# Patient Record
Sex: FEMALE | Race: WHITE | NOT HISPANIC OR LATINO | Employment: OTHER | URBAN - NONMETROPOLITAN AREA
[De-identification: names, ages, dates, MRNs, and addresses within clinical notes are randomized per-mention and may not be internally consistent; named-entity substitution may affect disease eponyms.]

---

## 2017-06-05 ENCOUNTER — APPOINTMENT (OUTPATIENT)
Dept: CT IMAGING | Facility: HOSPITAL | Age: 82
End: 2017-06-05

## 2017-06-05 ENCOUNTER — HOSPITAL ENCOUNTER (EMERGENCY)
Facility: HOSPITAL | Age: 82
Discharge: HOME | End: 2017-06-05
Attending: EMERGENCY MEDICINE | Admitting: EMERGENCY MEDICINE

## 2017-06-05 ENCOUNTER — APPOINTMENT (OUTPATIENT)
Dept: GENERAL RADIOLOGY | Facility: HOSPITAL | Age: 82
End: 2017-06-05

## 2017-06-05 VITALS
HEART RATE: 81 BPM | HEIGHT: 63 IN | DIASTOLIC BLOOD PRESSURE: 91 MMHG | SYSTOLIC BLOOD PRESSURE: 182 MMHG | RESPIRATION RATE: 18 BRPM | BODY MASS INDEX: 20.55 KG/M2 | TEMPERATURE: 97.6 F | WEIGHT: 116 LBS | OXYGEN SATURATION: 98 %

## 2017-06-05 DIAGNOSIS — K59.00 CONSTIPATION, UNSPECIFIED CONSTIPATION TYPE: ICD-10-CM
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
## 2017-06-05 DIAGNOSIS — S22.000A COMPRESSION FX, THORACIC SPINE, CLOSED, INITIAL ENCOUNTER (HCC): ICD-10-CM

## 2017-06-05 PROCEDURE — 99284 EMERGENCY DEPT VISIT MOD MDM: CPT

## 2017-06-05 PROCEDURE — 70450 CT HEAD/BRAIN W/O DYE: CPT | Performed by: RADIOLOGY

## 2017-06-05 PROCEDURE — 71101 X-RAY EXAM UNILAT RIBS/CHEST: CPT | Performed by: RADIOLOGY

## 2017-06-05 PROCEDURE — 72131 CT LUMBAR SPINE W/O DYE: CPT

## 2017-06-05 PROCEDURE — 72128 CT CHEST SPINE W/O DYE: CPT

## 2017-06-05 PROCEDURE — 72131 CT LUMBAR SPINE W/O DYE: CPT | Performed by: RADIOLOGY

## 2017-06-05 PROCEDURE — 70450 CT HEAD/BRAIN W/O DYE: CPT

## 2017-06-05 PROCEDURE — 93005 ELECTROCARDIOGRAM TRACING: CPT | Performed by: EMERGENCY MEDICINE

## 2017-06-05 PROCEDURE — 93010 ELECTROCARDIOGRAM REPORT: CPT | Performed by: INTERNAL MEDICINE

## 2017-06-05 PROCEDURE — 72128 CT CHEST SPINE W/O DYE: CPT | Performed by: RADIOLOGY

## 2017-06-05 PROCEDURE — 71101 X-RAY EXAM UNILAT RIBS/CHEST: CPT

## 2017-06-05 RX ORDER — CLONIDINE HYDROCHLORIDE 0.2 MG/1
0.2 TABLET ORAL 3 TIMES DAILY
COMMUNITY

## 2017-06-05 RX ORDER — FOSINOPRIL SODIUM 40 MG/1
40 TABLET ORAL DAILY
COMMUNITY

## 2017-06-05 RX ORDER — OMEPRAZOLE 20 MG/1
20 CAPSULE, DELAYED RELEASE ORAL DAILY
COMMUNITY

## 2017-06-05 RX ORDER — RANITIDINE 150 MG/1
150 TABLET ORAL DAILY
COMMUNITY
End: 2020-07-15

## 2017-06-05 RX ORDER — ASPIRIN 81 MG/1
81 TABLET, CHEWABLE ORAL DAILY
Status: ON HOLD | COMMUNITY
End: 2017-07-24 | Stop reason: DRUGHIGH

## 2017-06-05 RX ORDER — SIMVASTATIN 40 MG
40 TABLET ORAL NIGHTLY
COMMUNITY
End: 2017-07-27 | Stop reason: HOSPADM

## 2017-06-05 RX ORDER — HYDROCODONE BITARTRATE AND ACETAMINOPHEN 5; 325 MG/1; MG/1
1 TABLET ORAL ONCE
Status: COMPLETED | OUTPATIENT
Start: 2017-06-05 | End: 2017-06-05

## 2017-06-05 RX ORDER — AMLODIPINE BESYLATE 5 MG/1
5 TABLET ORAL DAILY
COMMUNITY

## 2017-06-05 RX ORDER — LEVOTHYROXINE SODIUM 0.1 MG/1
100 TABLET ORAL DAILY
COMMUNITY

## 2017-06-05 RX ORDER — ASCORBIC ACID 500 MG
500 TABLET ORAL DAILY
COMMUNITY

## 2017-06-05 RX ORDER — POLYETHYLENE GLYCOL 3350 17 G/17G
17 POWDER, FOR SOLUTION ORAL DAILY
Qty: 250 G | Refills: 0 | Status: SHIPPED | OUTPATIENT
Start: 2017-06-05

## 2017-06-05 RX ORDER — HYDRALAZINE HYDROCHLORIDE 25 MG/1
25 TABLET, FILM COATED ORAL 3 TIMES DAILY
COMMUNITY
End: 2017-07-27 | Stop reason: HOSPADM

## 2017-06-05 RX ORDER — CLOPIDOGREL BISULFATE 75 MG/1
75 TABLET ORAL NIGHTLY
COMMUNITY

## 2017-06-05 RX ORDER — ACETAMINOPHEN AND CODEINE PHOSPHATE 300; 30 MG/1; MG/1
1 TABLET ORAL EVERY 6 HOURS PRN
Qty: 12 TABLET | Refills: 0 | Status: ON HOLD | OUTPATIENT
Start: 2017-06-05 | End: 2017-07-24

## 2017-06-05 RX ORDER — LORAZEPAM 2 MG/ML
INJECTION INTRAMUSCULAR
Status: DISCONTINUED
Start: 2017-06-05 | End: 2017-06-05 | Stop reason: WASHOUT

## 2017-06-05 RX ADMIN — HYDROCODONE BITARTRATE AND ACETAMINOPHEN 1 TABLET: 5; 325 TABLET ORAL at 17:11

## 2017-06-05 NOTE — ED PROVIDER NOTES
Subjective   HPI Comments: Explained to family to follow up with dr paget number was given     Patient is a 88 y.o. female presenting with fall.   History provided by:  Patient   used: No    Fall   Injury location:  Head/neck  Time since incident:  18 hours  Arrived directly from scene: no    Prior to arrival data:     Bystander interventions:  None    Patient ambulatory at scene: yes      Blood loss:  None    Orientation at scene:  Person, place, situation and time  Associated symptoms: back pain    Associated symptoms: no chest pain, no headaches, no nausea and no vomiting        Review of Systems   Constitutional: Negative for chills and fever.   HENT: Negative for congestion, ear pain and sore throat.    Respiratory: Negative for cough, shortness of breath and wheezing.    Cardiovascular: Negative for chest pain.   Gastrointestinal: Negative for diarrhea, nausea and vomiting.   Genitourinary: Negative for dysuria and flank pain.   Musculoskeletal: Positive for arthralgias and back pain.   Skin: Negative for rash.   Neurological: Negative for headaches.   Psychiatric/Behavioral: Negative for suicidal ideas. The patient is not nervous/anxious.    All other systems reviewed and are negative.      Past Medical History:   Diagnosis Date   • Hypertension    • Thyroid disease        No Known Allergies    History reviewed. No pertinent surgical history.    History reviewed. No pertinent family history.    Social History     Social History   • Marital status:      Spouse name: N/A   • Number of children: N/A   • Years of education: N/A     Social History Main Topics   • Smoking status: Never Smoker   • Smokeless tobacco: None   • Alcohol use No   • Drug use: No   • Sexual activity: Not Asked     Other Topics Concern   • None     Social History Narrative   • None           Objective   Physical Exam   Constitutional: She is oriented to person, place, and time. She appears well-developed and  well-nourished.   HENT:   Head: Normocephalic.   Mouth/Throat: Oropharynx is clear and moist.   Neck: Neck supple.   Cardiovascular: Normal rate and regular rhythm.    Pulmonary/Chest: Effort normal and breath sounds normal.   Abdominal: Soft. Bowel sounds are normal. There is no tenderness.   Musculoskeletal: Normal range of motion.   Neurological: She is alert and oriented to person, place, and time.   Skin: Skin is warm and dry.   Psychiatric: She has a normal mood and affect. Her behavior is normal. Judgment and thought content normal.   Nursing note and vitals reviewed.      Procedures         ED Course  ED Course   Comment By Time   84 NORMAL SINUS RHYTHM PER EZIO Ireland 06/05 1956                  [facility]    Final diagnoses:   Fall, initial encounter   Constipation, unspecified constipation type   Compression fx, thoracic spine, closed, initial encounter            EZIO Perry  06/08/17 1010       EZIO Perry  06/08/17 1013

## 2017-06-05 NOTE — ED NOTES
Patient noted to be lying in bed with HOB elevated. Family noted to be at bedside. Updated that patient has an order for a CT scan of the head and Chest X-ray PA. Patient complaints of soreness to lower back. No complaints of nausea or vomiting. Denies any needs at this time. NADN. Resps even and unlabored. Safety precautions in place, call light within reach.      Lory Toledo RN  06/05/17 1942

## 2017-07-23 ENCOUNTER — HOSPITAL ENCOUNTER (INPATIENT)
Facility: HOSPITAL | Age: 82
LOS: 2 days | Discharge: HOME | End: 2017-07-27
Attending: EMERGENCY MEDICINE | Admitting: INTERNAL MEDICINE

## 2017-07-23 ENCOUNTER — APPOINTMENT (OUTPATIENT)
Dept: CT IMAGING | Facility: HOSPITAL | Age: 82
End: 2017-07-23

## 2017-07-23 ENCOUNTER — APPOINTMENT (OUTPATIENT)
Dept: GENERAL RADIOLOGY | Facility: HOSPITAL | Age: 82
End: 2017-07-23

## 2017-07-23 DIAGNOSIS — K80.20 MULTIPLE GALLSTONES: ICD-10-CM

## 2017-07-23 DIAGNOSIS — S22.000G CLOSED COMPRESSION FRACTURE OF THORACIC VERTEBRA, WITH DELAYED HEALING, SUBSEQUENT ENCOUNTER: ICD-10-CM

## 2017-07-23 DIAGNOSIS — R77.8 ELEVATED TROPONIN: Primary | ICD-10-CM

## 2017-07-23 LAB
6-ACETYL MORPHINE: NEGATIVE
A-A DO2: 29.4 MMHG (ref 0–300)
ALBUMIN SERPL-MCNC: 4.5 G/DL (ref 3.4–4.8)
ALBUMIN/GLOB SERPL: 1.2 G/DL (ref 1.5–2.5)
ALP SERPL-CCNC: 90 U/L (ref 35–104)
ALT SERPL W P-5'-P-CCNC: 8 U/L (ref 10–36)
AMPHET+METHAMPHET UR QL: NEGATIVE
ANION GAP SERPL CALCULATED.3IONS-SCNC: 4.1 MMOL/L (ref 3.6–11.2)
ARTERIAL PATENCY WRIST A: ABNORMAL
AST SERPL-CCNC: 16 U/L (ref 10–30)
ATMOSPHERIC PRESS: 725 MMHG
BACTERIA UR QL AUTO: ABNORMAL /HPF
BARBITURATES UR QL SCN: NEGATIVE
BASE EXCESS BLDA CALC-SCNC: 1.4 MMOL/L
BASOPHILS # BLD AUTO: 0.07 10*3/MM3 (ref 0–0.3)
BASOPHILS NFR BLD AUTO: 0.5 % (ref 0–2)
BDY SITE: ABNORMAL
BENZODIAZ UR QL SCN: NEGATIVE
BILIRUB SERPL-MCNC: 0.5 MG/DL (ref 0.2–1.8)
BILIRUB UR QL STRIP: NEGATIVE
BNP SERPL-MCNC: 173 PG/ML (ref 0–100)
BODY TEMPERATURE: 98.6 C
BUN BLD-MCNC: 20 MG/DL (ref 7–21)
BUN/CREAT SERPL: 17.5 (ref 7–25)
BUPRENORPHINE SERPL-MCNC: NEGATIVE NG/ML
CALCIUM SPEC-SCNC: 10 MG/DL (ref 7.7–10)
CANNABINOIDS SERPL QL: NEGATIVE
CHLORIDE SERPL-SCNC: 107 MMOL/L (ref 99–112)
CK MB SERPL-CCNC: 2.51 NG/ML (ref 0–5)
CK MB SERPL-RTO: 5.7 % (ref 0–3)
CK SERPL-CCNC: 44 U/L (ref 24–173)
CLARITY UR: CLEAR
CO2 SERPL-SCNC: 30.9 MMOL/L (ref 24.3–31.9)
COCAINE UR QL: NEGATIVE
COHGB MFR BLD: 2 % (ref 0–5)
COLOR UR: YELLOW
CREAT BLD-MCNC: 1.14 MG/DL (ref 0.43–1.29)
DEPRECATED RDW RBC AUTO: 52.2 FL (ref 37–54)
EOSINOPHIL # BLD AUTO: 0.09 10*3/MM3 (ref 0–0.7)
EOSINOPHIL NFR BLD AUTO: 0.6 % (ref 0–7)
ERYTHROCYTE [DISTWIDTH] IN BLOOD BY AUTOMATED COUNT: 16 % (ref 11.5–14.5)
GFR SERPL CREATININE-BSD FRML MDRD: 45 ML/MIN/1.73
GLOBULIN UR ELPH-MCNC: 3.7 GM/DL
GLUCOSE BLD-MCNC: 118 MG/DL (ref 70–110)
GLUCOSE UR STRIP-MCNC: NEGATIVE MG/DL
HCO3 BLDA-SCNC: 24.7 MMOL/L (ref 22–26)
HCT VFR BLD AUTO: 41.1 % (ref 37–47)
HCT VFR BLD CALC: 42 % (ref 37–47)
HGB BLD-MCNC: 13.2 G/DL (ref 12–16)
HGB BLDA-MCNC: 14.3 G/DL (ref 12–16)
HGB UR QL STRIP.AUTO: NEGATIVE
HOROWITZ INDEX BLD+IHG-RTO: 21 %
HYALINE CASTS UR QL AUTO: ABNORMAL /LPF
IMM GRANULOCYTES # BLD: 0.16 10*3/MM3 (ref 0–0.03)
IMM GRANULOCYTES NFR BLD: 1.1 % (ref 0–0.5)
KETONES UR QL STRIP: NEGATIVE
LEUKOCYTE ESTERASE UR QL STRIP.AUTO: ABNORMAL
LYMPHOCYTES # BLD AUTO: 1.85 10*3/MM3 (ref 1–3)
LYMPHOCYTES NFR BLD AUTO: 12.9 % (ref 16–46)
MCH RBC QN AUTO: 29.7 PG (ref 27–33)
MCHC RBC AUTO-ENTMCNC: 32.1 G/DL (ref 33–37)
MCV RBC AUTO: 92.4 FL (ref 80–94)
METHADONE UR QL SCN: NEGATIVE
METHGB BLD QL: 0 % (ref 0–3)
MODALITY: ABNORMAL
MONOCYTES # BLD AUTO: 2.12 10*3/MM3 (ref 0.1–0.9)
MONOCYTES NFR BLD AUTO: 14.8 % (ref 0–12)
MYOGLOBIN SERPL-MCNC: 53 NG/ML (ref 0–109)
NEUTROPHILS # BLD AUTO: 10.07 10*3/MM3 (ref 1.4–6.5)
NEUTROPHILS NFR BLD AUTO: 70.1 % (ref 40–75)
NITRITE UR QL STRIP: NEGATIVE
OPIATES UR QL: NEGATIVE
OSMOLALITY SERPL CALC.SUM OF ELEC: 286.8 MOSM/KG (ref 273–305)
OXYCODONE UR QL SCN: NEGATIVE
OXYHGB MFR BLDV: 93.2 % (ref 85–100)
PCO2 BLDA: 34.9 MM HG (ref 35–45)
PCP UR QL SCN: NEGATIVE
PH BLDA: 7.47 PH UNITS (ref 7.35–7.45)
PH UR STRIP.AUTO: 6.5 [PH] (ref 5–8)
PLATELET # BLD AUTO: 213 10*3/MM3 (ref 130–400)
PMV BLD AUTO: 12.4 FL (ref 6–10)
PO2 BLDA: 71.2 MM HG (ref 80–100)
POTASSIUM BLD-SCNC: 3.7 MMOL/L (ref 3.5–5.3)
PROT SERPL-MCNC: 8.2 G/DL (ref 6–8)
PROT UR QL STRIP: NEGATIVE
RBC # BLD AUTO: 4.45 10*6/MM3 (ref 4.2–5.4)
RBC # UR: ABNORMAL /HPF
REF LAB TEST METHOD: ABNORMAL
SAO2 % BLDCOA: 95.1 % (ref 90–100)
SODIUM BLD-SCNC: 142 MMOL/L (ref 135–153)
SP GR UR STRIP: 1.01 (ref 1–1.03)
SQUAMOUS #/AREA URNS HPF: ABNORMAL /HPF
TROPONIN I SERPL-MCNC: 0.05 NG/ML
UROBILINOGEN UR QL STRIP: ABNORMAL
WBC NRBC COR # BLD: 14.36 10*3/MM3 (ref 4.5–12.5)
WBC UR QL AUTO: ABNORMAL /HPF

## 2017-07-23 PROCEDURE — 83050 HGB METHEMOGLOBIN QUAN: CPT | Performed by: PHYSICIAN ASSISTANT

## 2017-07-23 PROCEDURE — 83874 ASSAY OF MYOGLOBIN: CPT | Performed by: PHYSICIAN ASSISTANT

## 2017-07-23 PROCEDURE — 71250 CT THORAX DX C-: CPT | Performed by: RADIOLOGY

## 2017-07-23 PROCEDURE — 80307 DRUG TEST PRSMV CHEM ANLYZR: CPT | Performed by: PHYSICIAN ASSISTANT

## 2017-07-23 PROCEDURE — 96365 THER/PROPH/DIAG IV INF INIT: CPT

## 2017-07-23 PROCEDURE — 70450 CT HEAD/BRAIN W/O DYE: CPT | Performed by: RADIOLOGY

## 2017-07-23 PROCEDURE — 71020 HC CHEST PA AND LATERAL: CPT

## 2017-07-23 PROCEDURE — 83880 ASSAY OF NATRIURETIC PEPTIDE: CPT | Performed by: PHYSICIAN ASSISTANT

## 2017-07-23 PROCEDURE — 25010000002 PIPERACILLIN-TAZOBACTAM: Performed by: FAMILY MEDICINE

## 2017-07-23 PROCEDURE — 82805 BLOOD GASES W/O2 SATURATION: CPT | Performed by: PHYSICIAN ASSISTANT

## 2017-07-23 PROCEDURE — 71020 XR CHEST 2 VW: CPT | Performed by: RADIOLOGY

## 2017-07-23 PROCEDURE — 80053 COMPREHEN METABOLIC PANEL: CPT | Performed by: PHYSICIAN ASSISTANT

## 2017-07-23 PROCEDURE — 82550 ASSAY OF CK (CPK): CPT | Performed by: PHYSICIAN ASSISTANT

## 2017-07-23 PROCEDURE — 25010000002 HYDRALAZINE PER 20 MG: Performed by: FAMILY MEDICINE

## 2017-07-23 PROCEDURE — 93005 ELECTROCARDIOGRAM TRACING: CPT | Performed by: EMERGENCY MEDICINE

## 2017-07-23 PROCEDURE — 81001 URINALYSIS AUTO W/SCOPE: CPT | Performed by: PHYSICIAN ASSISTANT

## 2017-07-23 PROCEDURE — G0378 HOSPITAL OBSERVATION PER HR: HCPCS

## 2017-07-23 PROCEDURE — 84484 ASSAY OF TROPONIN QUANT: CPT | Performed by: FAMILY MEDICINE

## 2017-07-23 PROCEDURE — 85025 COMPLETE CBC W/AUTO DIFF WBC: CPT | Performed by: PHYSICIAN ASSISTANT

## 2017-07-23 PROCEDURE — 84484 ASSAY OF TROPONIN QUANT: CPT | Performed by: PHYSICIAN ASSISTANT

## 2017-07-23 PROCEDURE — 96375 TX/PRO/DX INJ NEW DRUG ADDON: CPT

## 2017-07-23 PROCEDURE — 82553 CREATINE MB FRACTION: CPT | Performed by: PHYSICIAN ASSISTANT

## 2017-07-23 PROCEDURE — 82375 ASSAY CARBOXYHB QUANT: CPT | Performed by: PHYSICIAN ASSISTANT

## 2017-07-23 PROCEDURE — 99284 EMERGENCY DEPT VISIT MOD MDM: CPT

## 2017-07-23 PROCEDURE — 36415 COLL VENOUS BLD VENIPUNCTURE: CPT

## 2017-07-23 PROCEDURE — 71250 CT THORAX DX C-: CPT

## 2017-07-23 PROCEDURE — 36600 WITHDRAWAL OF ARTERIAL BLOOD: CPT | Performed by: PHYSICIAN ASSISTANT

## 2017-07-23 PROCEDURE — 70450 CT HEAD/BRAIN W/O DYE: CPT

## 2017-07-23 RX ORDER — CLOPIDOGREL BISULFATE 75 MG/1
75 TABLET ORAL ONCE
Status: COMPLETED | OUTPATIENT
Start: 2017-07-23 | End: 2017-07-23

## 2017-07-23 RX ORDER — ASPIRIN 81 MG/1
324 TABLET, CHEWABLE ORAL ONCE
Status: COMPLETED | OUTPATIENT
Start: 2017-07-23 | End: 2017-07-23

## 2017-07-23 RX ORDER — HYDRALAZINE HYDROCHLORIDE 20 MG/ML
10 INJECTION INTRAMUSCULAR; INTRAVENOUS ONCE
Status: COMPLETED | OUTPATIENT
Start: 2017-07-23 | End: 2017-07-23

## 2017-07-23 RX ORDER — SODIUM CHLORIDE 0.9 % (FLUSH) 0.9 %
10 SYRINGE (ML) INJECTION AS NEEDED
Status: DISCONTINUED | OUTPATIENT
Start: 2017-07-23 | End: 2017-07-27 | Stop reason: HOSPADM

## 2017-07-23 RX ORDER — HYDRALAZINE HYDROCHLORIDE 20 MG/ML
20 INJECTION INTRAMUSCULAR; INTRAVENOUS ONCE
Status: DISCONTINUED | OUTPATIENT
Start: 2017-07-23 | End: 2017-07-23

## 2017-07-23 RX ORDER — NITROGLYCERIN 0.4 MG/1
0.4 TABLET SUBLINGUAL
Status: DISCONTINUED | OUTPATIENT
Start: 2017-07-23 | End: 2017-07-27 | Stop reason: HOSPADM

## 2017-07-23 RX ADMIN — CLOPIDOGREL 75 MG: 75 TABLET, FILM COATED ORAL at 23:37

## 2017-07-23 RX ADMIN — TAZOBACTAM SODIUM AND PIPERACILLIN SODIUM 4.5 G: .5; 4 INJECTION, POWDER, LYOPHILIZED, FOR SOLUTION INTRAVENOUS at 22:58

## 2017-07-23 RX ADMIN — HYDRALAZINE HYDROCHLORIDE 10 MG: 20 INJECTION INTRAMUSCULAR; INTRAVENOUS at 21:53

## 2017-07-23 RX ADMIN — ASPIRIN 324 MG: 81 TABLET, CHEWABLE ORAL at 22:54

## 2017-07-24 ENCOUNTER — APPOINTMENT (OUTPATIENT)
Dept: CARDIOLOGY | Facility: HOSPITAL | Age: 82
End: 2017-07-24
Attending: INTERNAL MEDICINE

## 2017-07-24 LAB
BASOPHILS # BLD AUTO: 0.08 10*3/MM3 (ref 0–0.3)
BASOPHILS NFR BLD AUTO: 0.5 % (ref 0–2)
BH CV ECHO MEAS - ACS: 1.3 CM
BH CV ECHO MEAS - AO ROOT AREA (BSA CORRECTED): 1.8
BH CV ECHO MEAS - AO ROOT AREA: 5.3 CM^2
BH CV ECHO MEAS - AO ROOT DIAM: 2.6 CM
BH CV ECHO MEAS - BSA(HAYCOCK): 1.4 M^2
BH CV ECHO MEAS - BSA: 1.4 M^2
BH CV ECHO MEAS - BZI_BMI: 21.1 KILOGRAMS/M^2
BH CV ECHO MEAS - BZI_METRIC_HEIGHT: 152.4 CM
BH CV ECHO MEAS - BZI_METRIC_WEIGHT: 49 KG
BH CV ECHO MEAS - CONTRAST EF 4CH: 46 ML/M^2
BH CV ECHO MEAS - EDV(CUBED): 124 ML
BH CV ECHO MEAS - EDV(MOD-SP4): 50 ML
BH CV ECHO MEAS - EDV(TEICH): 117.5 ML
BH CV ECHO MEAS - EF(CUBED): 67 %
BH CV ECHO MEAS - EF(MOD-SP4): 46 %
BH CV ECHO MEAS - EF(TEICH): 58.3 %
BH CV ECHO MEAS - ESV(CUBED): 40.9 ML
BH CV ECHO MEAS - ESV(MOD-SP4): 27 ML
BH CV ECHO MEAS - ESV(TEICH): 49 ML
BH CV ECHO MEAS - FS: 30.9 %
BH CV ECHO MEAS - IVS/LVPW: 1.3
BH CV ECHO MEAS - IVSD: 1.1 CM
BH CV ECHO MEAS - LA DIMENSION: 3.8 CM
BH CV ECHO MEAS - LA/AO: 1.5
BH CV ECHO MEAS - LV DIASTOLIC VOL/BSA (35-75): 34.8 ML/M^2
BH CV ECHO MEAS - LV MASS(C)D: 183.2 GRAMS
BH CV ECHO MEAS - LV MASS(C)DI: 127.5 GRAMS/M^2
BH CV ECHO MEAS - LV SYSTOLIC VOL/BSA (12-30): 18.8 ML/M^2
BH CV ECHO MEAS - LVIDD: 5 CM
BH CV ECHO MEAS - LVIDS: 3.4 CM
BH CV ECHO MEAS - LVLD AP4: 6.8 CM
BH CV ECHO MEAS - LVLS AP4: 6.5 CM
BH CV ECHO MEAS - LVOT AREA (M): 3.1 CM^2
BH CV ECHO MEAS - LVOT AREA: 3.1 CM^2
BH CV ECHO MEAS - LVOT DIAM: 2 CM
BH CV ECHO MEAS - LVPWD: 0.88 CM
BH CV ECHO MEAS - MV A MAX VEL: 124.1 CM/SEC
BH CV ECHO MEAS - MV E MAX VEL: 65.1 CM/SEC
BH CV ECHO MEAS - MV E/A: 0.52
BH CV ECHO MEAS - PA ACC SLOPE: 496 CM/SEC^2
BH CV ECHO MEAS - PA ACC TIME: 0.14 SEC
BH CV ECHO MEAS - PA PR(ACCEL): 17.2 MMHG
BH CV ECHO MEAS - SI(CUBED): 57.8 ML/M^2
BH CV ECHO MEAS - SI(MOD-SP4): 16 ML/M^2
BH CV ECHO MEAS - SI(TEICH): 47.7 ML/M^2
BH CV ECHO MEAS - SV(CUBED): 83 ML
BH CV ECHO MEAS - SV(MOD-SP4): 23 ML
BH CV ECHO MEAS - SV(TEICH): 68.5 ML
CK MB SERPL-CCNC: 1.42 NG/ML (ref 0–5)
CK MB SERPL-CCNC: 2.39 NG/ML (ref 0–5)
CK MB SERPL-CCNC: 2.41 NG/ML (ref 0–5)
CK MB SERPL-RTO: 2.8 % (ref 0–3)
CK MB SERPL-RTO: 3.9 % (ref 0–3)
CK MB SERPL-RTO: 4.2 % (ref 0–3)
CK SERPL-CCNC: 50 U/L (ref 24–173)
CK SERPL-CCNC: 58 U/L (ref 24–173)
CK SERPL-CCNC: 61 U/L (ref 24–173)
DEPRECATED RDW RBC AUTO: 52.4 FL (ref 37–54)
EOSINOPHIL # BLD AUTO: 0.07 10*3/MM3 (ref 0–0.7)
EOSINOPHIL NFR BLD AUTO: 0.4 % (ref 0–7)
ERYTHROCYTE [DISTWIDTH] IN BLOOD BY AUTOMATED COUNT: 16.1 % (ref 11.5–14.5)
HCT VFR BLD AUTO: 38.7 % (ref 37–47)
HGB BLD-MCNC: 12.5 G/DL (ref 12–16)
IMM GRANULOCYTES # BLD: 0.31 10*3/MM3 (ref 0–0.03)
IMM GRANULOCYTES NFR BLD: 1.9 % (ref 0–0.5)
LV EF 2D ECHO EST: 65 %
LYMPHOCYTES # BLD AUTO: 1.9 10*3/MM3 (ref 1–3)
LYMPHOCYTES NFR BLD AUTO: 11.7 % (ref 16–46)
MCH RBC QN AUTO: 30.1 PG (ref 27–33)
MCHC RBC AUTO-ENTMCNC: 32.3 G/DL (ref 33–37)
MCV RBC AUTO: 93.3 FL (ref 80–94)
MONOCYTES # BLD AUTO: 2.09 10*3/MM3 (ref 0.1–0.9)
MONOCYTES NFR BLD AUTO: 12.9 % (ref 0–12)
MYOGLOBIN SERPL-MCNC: 115 NG/ML (ref 0–109)
MYOGLOBIN SERPL-MCNC: 74 NG/ML (ref 0–109)
MYOGLOBIN SERPL-MCNC: 84 NG/ML (ref 0–109)
NEUTROPHILS # BLD AUTO: 11.73 10*3/MM3 (ref 1.4–6.5)
NEUTROPHILS NFR BLD AUTO: 72.6 % (ref 40–75)
PLATELET # BLD AUTO: 204 10*3/MM3 (ref 130–400)
PMV BLD AUTO: 12.4 FL (ref 6–10)
RBC # BLD AUTO: 4.15 10*6/MM3 (ref 4.2–5.4)
TROPONIN I SERPL-MCNC: 0.04 NG/ML
TROPONIN I SERPL-MCNC: 0.04 NG/ML
TROPONIN I SERPL-MCNC: 0.05 NG/ML
WBC NRBC COR # BLD: 16.18 10*3/MM3 (ref 4.5–12.5)

## 2017-07-24 PROCEDURE — 93010 ELECTROCARDIOGRAM REPORT: CPT | Performed by: INTERNAL MEDICINE

## 2017-07-24 PROCEDURE — 84484 ASSAY OF TROPONIN QUANT: CPT | Performed by: FAMILY MEDICINE

## 2017-07-24 PROCEDURE — 99222 1ST HOSP IP/OBS MODERATE 55: CPT | Performed by: INTERNAL MEDICINE

## 2017-07-24 PROCEDURE — G0378 HOSPITAL OBSERVATION PER HR: HCPCS

## 2017-07-24 PROCEDURE — 82553 CREATINE MB FRACTION: CPT | Performed by: FAMILY MEDICINE

## 2017-07-24 PROCEDURE — 83874 ASSAY OF MYOGLOBIN: CPT | Performed by: FAMILY MEDICINE

## 2017-07-24 PROCEDURE — 93005 ELECTROCARDIOGRAM TRACING: CPT | Performed by: FAMILY MEDICINE

## 2017-07-24 PROCEDURE — 94799 UNLISTED PULMONARY SVC/PX: CPT

## 2017-07-24 PROCEDURE — 85025 COMPLETE CBC W/AUTO DIFF WBC: CPT | Performed by: FAMILY MEDICINE

## 2017-07-24 PROCEDURE — 93306 TTE W/DOPPLER COMPLETE: CPT

## 2017-07-24 PROCEDURE — 82550 ASSAY OF CK (CPK): CPT | Performed by: FAMILY MEDICINE

## 2017-07-24 PROCEDURE — 93306 TTE W/DOPPLER COMPLETE: CPT | Performed by: INTERNAL MEDICINE

## 2017-07-24 RX ORDER — SPIRONOLACTONE 25 MG/1
25 TABLET ORAL DAILY
Status: DISCONTINUED | OUTPATIENT
Start: 2017-07-24 | End: 2017-07-27 | Stop reason: HOSPADM

## 2017-07-24 RX ORDER — ASPIRIN 81 MG/1
81 TABLET ORAL NIGHTLY
Status: CANCELLED | OUTPATIENT
Start: 2017-07-24

## 2017-07-24 RX ORDER — CLOPIDOGREL BISULFATE 75 MG/1
75 TABLET ORAL NIGHTLY
Status: DISCONTINUED | OUTPATIENT
Start: 2017-07-24 | End: 2017-07-27 | Stop reason: HOSPADM

## 2017-07-24 RX ORDER — HYDRALAZINE HYDROCHLORIDE 25 MG/1
25 TABLET, FILM COATED ORAL EVERY 8 HOURS SCHEDULED
Status: DISCONTINUED | OUTPATIENT
Start: 2017-07-24 | End: 2017-07-24

## 2017-07-24 RX ORDER — CLONIDINE HYDROCHLORIDE 0.2 MG/1
0.2 TABLET ORAL 3 TIMES DAILY
Status: CANCELLED | OUTPATIENT
Start: 2017-07-24

## 2017-07-24 RX ORDER — LEVOTHYROXINE SODIUM 0.1 MG/1
100 TABLET ORAL
Status: DISCONTINUED | OUTPATIENT
Start: 2017-07-24 | End: 2017-07-27 | Stop reason: HOSPADM

## 2017-07-24 RX ORDER — ASCORBIC ACID 500 MG
500 TABLET ORAL DAILY
Status: CANCELLED | OUTPATIENT
Start: 2017-07-24

## 2017-07-24 RX ORDER — HYDRALAZINE HYDROCHLORIDE 25 MG/1
25 TABLET, FILM COATED ORAL 3 TIMES DAILY
Status: CANCELLED | OUTPATIENT
Start: 2017-07-24

## 2017-07-24 RX ORDER — PANTOPRAZOLE SODIUM 40 MG/1
40 TABLET, DELAYED RELEASE ORAL EVERY MORNING
Status: DISCONTINUED | OUTPATIENT
Start: 2017-07-24 | End: 2017-07-27 | Stop reason: HOSPADM

## 2017-07-24 RX ORDER — LEVOTHYROXINE SODIUM 0.1 MG/1
100 TABLET ORAL DAILY
Status: CANCELLED | OUTPATIENT
Start: 2017-07-24

## 2017-07-24 RX ORDER — ATORVASTATIN CALCIUM 20 MG/1
20 TABLET, FILM COATED ORAL NIGHTLY
Status: DISCONTINUED | OUTPATIENT
Start: 2017-07-24 | End: 2017-07-27 | Stop reason: HOSPADM

## 2017-07-24 RX ORDER — ASPIRIN 81 MG/1
81 TABLET ORAL NIGHTLY
COMMUNITY

## 2017-07-24 RX ORDER — CETIRIZINE HYDROCHLORIDE 10 MG/1
5 TABLET ORAL DAILY PRN
Status: DISCONTINUED | OUTPATIENT
Start: 2017-07-24 | End: 2017-07-27 | Stop reason: HOSPADM

## 2017-07-24 RX ORDER — CARVEDILOL 6.25 MG/1
6.25 TABLET ORAL EVERY 12 HOURS SCHEDULED
Status: DISCONTINUED | OUTPATIENT
Start: 2017-07-24 | End: 2017-07-26

## 2017-07-24 RX ORDER — SODIUM CHLORIDE 450 MG/100ML
75 INJECTION, SOLUTION INTRAVENOUS CONTINUOUS
Status: DISCONTINUED | OUTPATIENT
Start: 2017-07-24 | End: 2017-07-26

## 2017-07-24 RX ORDER — FAMOTIDINE 20 MG/1
20 TABLET, FILM COATED ORAL DAILY
Status: DISCONTINUED | OUTPATIENT
Start: 2017-07-24 | End: 2017-07-27 | Stop reason: HOSPADM

## 2017-07-24 RX ORDER — ATORVASTATIN CALCIUM 20 MG/1
20 TABLET, FILM COATED ORAL DAILY
Status: CANCELLED | OUTPATIENT
Start: 2017-07-24

## 2017-07-24 RX ORDER — CLONIDINE HYDROCHLORIDE 0.2 MG/1
0.2 TABLET ORAL EVERY 8 HOURS SCHEDULED
Status: DISCONTINUED | OUTPATIENT
Start: 2017-07-24 | End: 2017-07-27 | Stop reason: HOSPADM

## 2017-07-24 RX ORDER — PANTOPRAZOLE SODIUM 40 MG/1
40 TABLET, DELAYED RELEASE ORAL EVERY MORNING
Status: CANCELLED | OUTPATIENT
Start: 2017-07-24

## 2017-07-24 RX ORDER — LISINOPRIL 10 MG/1
40 TABLET ORAL
Status: CANCELLED | OUTPATIENT
Start: 2017-07-24

## 2017-07-24 RX ORDER — LABETALOL HYDROCHLORIDE 5 MG/ML
20 INJECTION, SOLUTION INTRAVENOUS EVERY 6 HOURS PRN
Status: DISCONTINUED | OUTPATIENT
Start: 2017-07-24 | End: 2017-07-27 | Stop reason: HOSPADM

## 2017-07-24 RX ORDER — AMLODIPINE BESYLATE 5 MG/1
5 TABLET ORAL DAILY
Status: CANCELLED | OUTPATIENT
Start: 2017-07-24

## 2017-07-24 RX ORDER — ASCORBIC ACID 500 MG
500 TABLET ORAL DAILY
Status: DISCONTINUED | OUTPATIENT
Start: 2017-07-24 | End: 2017-07-27 | Stop reason: HOSPADM

## 2017-07-24 RX ORDER — ASPIRIN 81 MG/1
81 TABLET ORAL NIGHTLY
Status: DISCONTINUED | OUTPATIENT
Start: 2017-07-24 | End: 2017-07-27 | Stop reason: HOSPADM

## 2017-07-24 RX ORDER — HYDRALAZINE HYDROCHLORIDE 50 MG/1
50 TABLET, FILM COATED ORAL EVERY 8 HOURS SCHEDULED
Status: DISCONTINUED | OUTPATIENT
Start: 2017-07-24 | End: 2017-07-27

## 2017-07-24 RX ORDER — LISINOPRIL 10 MG/1
40 TABLET ORAL
Status: DISCONTINUED | OUTPATIENT
Start: 2017-07-24 | End: 2017-07-27 | Stop reason: HOSPADM

## 2017-07-24 RX ORDER — CLOPIDOGREL BISULFATE 75 MG/1
75 TABLET ORAL NIGHTLY
Status: CANCELLED | OUTPATIENT
Start: 2017-07-24

## 2017-07-24 RX ORDER — POLYETHYLENE GLYCOL 3350 17 G/17G
17 POWDER, FOR SOLUTION ORAL DAILY PRN
Status: DISCONTINUED | OUTPATIENT
Start: 2017-07-24 | End: 2017-07-27 | Stop reason: HOSPADM

## 2017-07-24 RX ORDER — AMLODIPINE BESYLATE 5 MG/1
5 TABLET ORAL DAILY
Status: DISCONTINUED | OUTPATIENT
Start: 2017-07-24 | End: 2017-07-25

## 2017-07-24 RX ORDER — FAMOTIDINE 20 MG/1
20 TABLET, FILM COATED ORAL DAILY
Status: CANCELLED | OUTPATIENT
Start: 2017-07-24

## 2017-07-24 RX ORDER — LORATADINE 10 MG/1
10 TABLET ORAL DAILY PRN
COMMUNITY

## 2017-07-24 RX ADMIN — HYDRALAZINE HYDROCHLORIDE 50 MG: 50 TABLET ORAL at 23:02

## 2017-07-24 RX ADMIN — CARVEDILOL 6.25 MG: 6.25 TABLET, FILM COATED ORAL at 14:59

## 2017-07-24 RX ADMIN — CARVEDILOL 6.25 MG: 6.25 TABLET, FILM COATED ORAL at 21:12

## 2017-07-24 RX ADMIN — LABETALOL HYDROCHLORIDE 20 MG: 5 INJECTION, SOLUTION INTRAVENOUS at 11:10

## 2017-07-24 RX ADMIN — CLOPIDOGREL BISULFATE 75 MG: 75 TABLET, FILM COATED ORAL at 21:12

## 2017-07-24 RX ADMIN — CLONIDINE HYDROCHLORIDE 0.2 MG: 0.2 TABLET ORAL at 23:01

## 2017-07-24 RX ADMIN — ATORVASTATIN CALCIUM 20 MG: 20 TABLET, FILM COATED ORAL at 21:12

## 2017-07-24 RX ADMIN — CLONIDINE HYDROCHLORIDE 0.2 MG: 0.2 TABLET ORAL at 14:59

## 2017-07-24 RX ADMIN — SODIUM CHLORIDE 75 ML/HR: 4.5 INJECTION, SOLUTION INTRAVENOUS at 01:11

## 2017-07-24 RX ADMIN — ASPIRIN 81 MG: 81 TABLET ORAL at 21:12

## 2017-07-24 RX ADMIN — HYDRALAZINE HYDROCHLORIDE 50 MG: 50 TABLET ORAL at 14:59

## 2017-07-24 RX ADMIN — SODIUM CHLORIDE 75 ML/HR: 4.5 INJECTION, SOLUTION INTRAVENOUS at 19:53

## 2017-07-24 RX ADMIN — SODIUM CHLORIDE 75 ML/HR: 4.5 INJECTION, SOLUTION INTRAVENOUS at 17:17

## 2017-07-24 NOTE — CONSULTS
"Referring Provider: Dr Zheng  Reason for Consultation: Troponin elevation.    Patient Care Team:  CAROLINA Stevenson as PCP - General (Family Medicine)    Chief complaint: hypertension and dizziness    Subjective .     History of present illness:     The patient is a very poor informant and most of his history of present illness was obtained from the patient's daughter at bedside.    The patient is an 88-year-old white female with a history of stroke in the past, hypertension, thyroid disease, gastric esophageal reflux disease and multiple compression fractures who comes to the hospital for a hypertensive emergency.  According to the patient's daughter she had been dizzy and \"not feeling well\" although she cannot provide any further details on her symptoms.  On checking her blood pressure was noted to be 210/110.  According to the daughter she did report episodes of shortness of breath or last few days.  She also had one episode of chest pain 2 days ago where she states she felt like someone sitting on her chest.  She states the episode lasted approximately 10 minutes.  She states it was associated with shortness of breath.  There are no exacerbating or ameliorating factors.  He states the intensity was 5/10 on the pain scale.  At that point it was decided to bring her to the emergency room.  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  Troponins were checked and were noted to be minimally elevated at 0.047 at peak.  However her blood pressure was noted to be significantly elevated in the 180s systolic.  She was admitted to the medicine team and cardiology was consulted for further evaluation.      Review of Systems    Review of Systems   Constitution: Positive for chills, weakness and malaise/fatigue. Negative for diaphoresis and fever.   HENT: Positive for headaches and hearing loss. Negative for congestion, ear pain, nosebleeds " and sore throat.    Eyes: Positive for blurred vision. Negative for pain and redness.   Cardiovascular: Positive for chest pain. Negative for leg swelling, orthopnea, palpitations, paroxysmal nocturnal dyspnea and syncope.   Respiratory: Negative for cough, hemoptysis and shortness of breath.    Endocrine: Negative for cold intolerance and heat intolerance.   Hematologic/Lymphatic: Does not bruise/bleed easily.   Skin: Negative for rash.   Musculoskeletal: Positive for arthritis, back pain, joint pain and stiffness. Negative for myalgias.   Gastrointestinal: Positive for constipation. Negative for abdominal pain, diarrhea, hematochezia, hemorrhoids, melena, nausea and vomiting.   Genitourinary: Negative for dysuria and hematuria.   Neurological: Positive for dizziness, focal weakness and loss of balance. Negative for numbness.   Psychiatric/Behavioral: Positive for depression. The patient is nervous/anxious.        History    Past Medical History:   Diagnosis Date   • Compression fracture of thoracic spine, non-traumatic    • GERD (gastroesophageal reflux disease)    • Hypertension    • Stroke     2000   • Thyroid disease         Past Surgical History:   Procedure Laterality Date   • APPENDECTOMY     • THYROIDECTOMY         History reviewed. No pertinent family history.     Social History   Substance Use Topics   • Smoking status: Never Smoker   • Smokeless tobacco: Never Used   • Alcohol use No       Prescriptions Prior to Admission   Medication Sig Dispense Refill Last Dose   • amLODIPine (NORVASC) 5 MG tablet Take 5 mg by mouth Daily.   7/23/2017 at AM   • aspirin 81 MG EC tablet Take 81 mg by mouth Every Night.   7/22/2017 at PM   • CloNIDine (CATAPRES) 0.2 MG tablet Take 0.2 mg by mouth 3 (Three) Times a Day.   7/23/2017 at MIDDAY   • clopidogrel (PLAVIX) 75 MG tablet Take 75 mg by mouth Every Night.   7/22/2017 at PM   • fosinopril (MONOPRIL) 40 MG tablet Take 40 mg by mouth Daily.   7/23/2017 at AM   •  hydrALAZINE (APRESOLINE) 25 MG tablet Take 25 mg by mouth 3 (Three) Times a Day.   7/23/2017 at MIDDAY   • levothyroxine (SYNTHROID, LEVOTHROID) 100 MCG tablet Take 100 mcg by mouth Daily.   7/23/2017 at AM   • loratadine (CLARITIN) 10 MG tablet Take 10 mg by mouth Daily As Needed for Allergies.   Past Week at Unknown time   • omeprazole (priLOSEC) 20 MG capsule Take 20 mg by mouth Daily.   7/23/2017 at AM   • polyethylene glycol (MIRALAX) powder Take 17 g by mouth Daily. (Patient taking differently: Take 17 g by mouth Daily As Needed.) 250 g 0 Past Week at Unknown time   • raNITIdine (ZANTAC) 150 MG tablet Take 150 mg by mouth Daily.   7/23/2017 at MIDDAY   • simvastatin (ZOCOR) 40 MG tablet Take 40 mg by mouth Every Night.   7/22/2017 at PM   • vitamin C (ASCORBIC ACID) 500 MG tablet Take 500 mg by mouth Daily.   7/23/2017 at AM         Scheduled Meds:        amLODIPine 5 mg Oral Daily   aspirin 81 mg Oral Nightly   atorvastatin 20 mg Oral Nightly   carvedilol 6.25 mg Oral Q12H   CloNIDine 0.2 mg Oral Q8H   clopidogrel 75 mg Oral Nightly   enoxaparin 30 mg Subcutaneous Daily   famotidine 20 mg Oral Daily   hydrALAZINE 50 mg Oral Q8H   levothyroxine 100 mcg Oral Q AM   lisinopril 40 mg Oral Q24H   pantoprazole 40 mg Oral QAM   Pharmacy Meds to Bed Consult  Does not apply Daily   spironolactone 25 mg Oral Daily   vitamin C 500 mg Oral Daily   , Continuous Infusions:    sodium chloride 75 mL/hr Last Rate: 75 mL/hr (07/24/17 0111)   , PRN Meds:      cetirizine  •  labetalol  •  nitroglycerin  •  polyethylene glycol  •  Insert peripheral IV **AND** sodium chloride and Allergies:  Review of patient's allergies indicates no known allergies.    Objective     Vital Sign Min/Max for last 24 hours  Temp  Min: 97.4 °F (36.3 °C)  Max: 98.3 °F (36.8 °C)   BP  Min: 153/68  Max: 196/97   Pulse  Min: 83  Max: 100   Resp  Min: 18  Max: 18   SpO2  Min: 96 %  Max: 98 %   Flow (L/min)  Min: 2  Max: 2   Weight  Min: 108 lb 5 oz (49.1  "kg)  Max: 116 lb (52.6 kg)     Flowsheet Rows         First Filed Value    Admission Height  60\" (152.4 cm) Documented at 07/23/2017 2039    Admission Weight  116 lb (52.6 kg) Documented at 07/23/2017 2039             Ejection Fraction  No results found for: EF    Echo EF Estimated  No results found for: ECHOEFEST    Nuclear Stress Ejection Fraction  No components found for: NUCEF    Cath Ejection Fraction Quantitative  No results found for: CATHEF    Physical Exam   Constitutional: She appears well-developed and well-nourished.   Elderly white female lying comfortably on bed.   HENT:   Mouth/Throat: Oropharynx is clear and moist.   Eyes: EOM are normal. Pupils are equal, round, and reactive to light.   Neck: Neck supple. No JVD present. No tracheal deviation present. No thyromegaly present.   Cardiovascular: Normal rate, regular rhythm, S1 normal and S2 normal.  Exam reveals no gallop and no friction rub.    No murmur heard.  Pulmonary/Chest: Effort normal and breath sounds normal. No respiratory distress. She has no wheezes. She has no rales.   Abdominal: Soft. Bowel sounds are normal. She exhibits no mass. There is no tenderness.   Musculoskeletal: Normal range of motion. She exhibits no edema.   Lymphadenopathy:     She has no cervical adenopathy.   Neurological: She is alert.   The patient is awake and alert but appears mildly confused.   Skin: Skin is warm and dry. No rash noted.       Results Review:   I reviewed the patient's new clinical results.  I reviewed the patient's new imaging results and agree with the interpretation.  I reviewed the patient's other test results and agree with the interpretation  I personally viewed and interpreted the patient's EKG/Telemetry data      Assessment/Plan     Active Problems:    Elevated troponin    1.  Hypertensive emergency: Patient with an episode of hypertensive emergency with severely elevated blood pressure associated with headaches, dizziness and an episode of " chest pain.  Her blood pressures remained significantly elevated at this point.    2.  Chest pain: Patient with an episode of chest pain that is likely related to the hypertension but could also be related to occult ischemia.  She does have some risk factors for coronary artery disease including age, hypertension and previous history of stroke.    3.  Elevated troponin: Patient with minimally elevated troponin that does not appear to be trending up significantly.  This is likely related to hypertensive emergency, however occult ischemia cannot be ruled out.    Plan:    1.  Hypertensive emergency: At this point will start on carvedilol 6.25 mg by mouth daily and increase hydralazine to 50 mg by mouth 3 times a day.  She has just been given IV hydralazine and labetalol by the medicine team.  We'll need to monitor closely.  If her blood pressure remains high would consider transfer to CCU and initiation of Cardene infusion.    2.  Chest pain and mildly elevated troponin: Patient with chest pain and mildly elevated troponins that could be related to hypertension but also concerning for possible occult ischemia.  At this point we will evaluate further with an echocardiogram.  I discussed with the patient the option of proceeding with stress test and she is agreeable.  She also states that she would be willing to undergo a cardiac catheterization if needed.  However the patient states she is currently having a headache and has her blood pressure remains elevated at this point we will schedule a stress test for the morning.    I discussed the patients findings and my recommendations with patient and family    Sheldon Caldwell MD  07/24/17  1:09 PM

## 2017-07-24 NOTE — PLAN OF CARE
Problem: Patient Care Overview (Adult)  Goal: Discharge Needs Assessment  Outcome: Ongoing (interventions implemented as appropriate)  Discharge Planning Assessment   Kwasi     Patient Name: Evi Smith               MRN: [id number]  Today's Date: 7/24/2017                     Admit Date: 7/23/2017             Discharge Needs Assessment        07/24/17 1148     Living Environment     Lives With child(belkys), adult     Living Arrangements house     Transportation Available car     Living Environment     Provides Primary Care For no one     Primary Care Provided By child(belkys) (specify)     Quality Of Family Relationships supportive     Able to Return to Prior Living Arrangements yes       mkbmk       Discharge Plan        07/24/17 1149     Case Management/Social Work Plan     Plan Pt admitted on 7/23/17.  SS received consult per [facility] for discharge planning.  SS spoke with pt on this date.  Pt lives at home with daughter and plans to return home at discharge.  Pt currently does not utilize home health services.  Pt currently utilizes walker via unknown provider and wheelchair via family.  Pt's PCP is Dr. Zheng.  SS will follow and assist with discharge needs.     Patient/Family In Agreement With Plan yes         Discharge Placement      No information found                     Demographic Summary        07/24/17 1147     Referral Information     Referral Source nursing     Reason For Consult discharge planning       mkbmk       Functional Status      None       mkbmk       Psychosocial      None       mkbmk       Abuse/Neglect      None       mkbmk       Legal      None       mkbmk       Substance Abuse      None       mkbmk       Patient Forms      None       wilfredo Mo

## 2017-07-24 NOTE — H&P
Chief complaint chest pain and shortness of breath for last 2-3 days    Subjective     Patient is a 88 y.o. female presents with chest pain and shortness of breath last 2-3 days.  Symptoms started abruptly and has been present intermittently pain is retrosternal and dull increases with activity.  Shortness of breath has been mild without any cough or expectoration.  Patient does have multiple risk factor for coronary artery disease.  Her workup in the emergency room showed borderline troponin she was admitted to the hospital for further care.  Patient also has leukocytosis.  She has some nausea and abdominal tenderness and CT study showed gallstone, patient was empirically started on IV antibiotic.  We will consult cardiology first  to rule out ischemic heart disease, if the workup is negative then she may need evaluation by gastroenterologist or surgeon for gallstone.      History  Past Medical History:   Diagnosis Date   • Compression fracture of thoracic spine, non-traumatic    • GERD (gastroesophageal reflux disease)    • Hypertension    • Stroke     2000   • Thyroid disease      Past Surgical History:   Procedure Laterality Date   • APPENDECTOMY     • THYROIDECTOMY       History reviewed. No pertinent family history.  Social History   Substance Use Topics   • Smoking status: Never Smoker   • Smokeless tobacco: Never Used   • Alcohol use No     Prescriptions Prior to Admission   Medication Sig Dispense Refill Last Dose   • amLODIPine (NORVASC) 5 MG tablet Take 5 mg by mouth Daily.   7/23/2017 at AM   • aspirin 81 MG EC tablet Take 81 mg by mouth Every Night.   7/22/2017 at PM   • CloNIDine (CATAPRES) 0.2 MG tablet Take 0.2 mg by mouth 3 (Three) Times a Day.   7/23/2017 at [facility]   • clopidogrel (PLAVIX) 75 MG tablet Take 75 mg by mouth Every Night.   7/22/2017 at PM   • fosinopril (MONOPRIL) 40 MG tablet Take 40 mg by mouth Daily.   7/23/2017 at AM   • hydrALAZINE (APRESOLINE) 25 MG tablet Take 25 mg by  mouth 3 (Three) Times a Day.   7/23/2017 at [facility]   • levothyroxine (SYNTHROID, LEVOTHROID) 100 MCG tablet Take 100 mcg by mouth Daily.   7/23/2017 at AM   • loratadine (CLARITIN) 10 MG tablet Take 10 mg by mouth Daily As Needed for Allergies.   Past Week at Unknown time   • omeprazole (priLOSEC) 20 MG capsule Take 20 mg by mouth Daily.   7/23/2017 at AM   • polyethylene glycol (MIRALAX) powder Take 17 g by mouth Daily. (Patient taking differently: Take 17 g by mouth Daily As Needed.) 250 g 0 Past Week at Unknown time   • raNITIdine (ZANTAC) 150 MG tablet Take 150 mg by mouth Daily.   7/23/2017 at [facility]   • simvastatin (ZOCOR) 40 MG tablet Take 40 mg by mouth Every Night.   7/22/2017 at PM   • vitamin C (ASCORBIC ACID) 500 MG tablet Take 500 mg by mouth Daily.   7/23/2017 at AM     Allergies:  Review of patient's allergies indicates no known allergies.       Review of Systems:   Review of Systems - General ROS: negative for - fever, weight gain or weight loss.  Positive for weakness  Psychological ROS: negative for - anxiety, disorientation or hallucinations  Ophthalmic ROS: negative for - blurry vision or double vision  ENT ROS: negative for - hearing change or vertigo  Allergy and Immunology ROS: negative for - hives, insect bite sensitivity or immunodeficiency  Hematological and Lymphatic ROS: negative for - bruising or swollen lymph nodes  Endocrine ROS: negative for - galactorrhea or hair pattern changes, no polyuria or polydipsia.  Respiratory ROS: negative for - hemoptysis or sputum changes, cough or wheezing.  Positive for shortness of breath  Cardiovascular ROS: negative for - loss of consciousnes or palpitation.  Positive for chest  Gastrointestinal ROS: negative for - melena or stool incontinence, nausea vomiting , diarrhea or abdominal pain  Genito-Urinary ROS: negative for - genital ulcers or pelvic pain or dysuria  Musculoskeletal ROS: negative for - gait disturbance or muscular  weakness  Neurological ROS: negative for - bowel and bladder control changes or seizures, no focal weakness  Dermatological ROS: negative for hair changes and nail changes          Physical Exam:    Vital Signs  Temp:  [97.4 °F (36.3 °C)-98.3 °F (36.8 °C)] 97.4 °F (36.3 °C)  Heart Rate:  [] 90  Resp:  [18] 18  BP: (153-196)/() 153/68     General Appearance:    Alert, cooperative, in no acute distress   Head:  Normocephalic, without obvious abnormality, atraumatic   Eyes:          Lids and lashes normal, conjunctivae and sclerae normal, no icterus, no pallor, corneas clear, PERRLA   Ears:  Ears appear intact with no abnormalities noted   Throat: No oral lesions, no thrush, oral mucosa moist   Neck: No adenopathy, supple, trachea midline, no thyromegaly, no carotid bruit, no JVD   Back:   no skin lesions, no erythema or scars, no tenderness to percussion or palpation.     Lungs:   Clear to auscultation,respirations regular, even and               Unlabored, no wheezing     Heart:  Regular rhythm and normal rate, normal S1 and S2, no        murmur, no gallop, no rub, no click   Abdomen:   Normal bowel sounds, no masses, no organomegaly, soft   non-tender, non-distended, no guarding, no rebound   tenderness   Extremities: Moves all extremities well, no edema, no cyanosis, no         redness   Pulses: Pulses palpable and equal bilaterally   Skin: No bleeding, bruising or rash   Lymph nodes: No palpable adenopathy   Neurologic: Cranial nerves 2 - 12 grossly intact, sensation intact, overall normal motor strength, DTR present and equal bilaterally             Labs:  Lab Results (last 24 hours)     Procedure Component Value Units Date/Time    Blood Gas, Arterial [[id number]]  (Abnormal) Collected:  07/23/17 2058    Specimen:  Arterial Blood Updated:  07/23/17 2102     Site Arterial: right brachial     Brant's Test N/A     pH, Arterial 7.467 (H) pH units      pCO2, Arterial 34.9 (L) mm Hg      pO2, Arterial 71.2  (L) mm Hg      HCO3, Arterial 24.7 mmol/L      Base Excess, Arterial 1.4 mmol/L      O2 Saturation, Arterial 95.1 %      Hemoglobin, Blood Gas 14.3 g/dL      Hematocrit, Blood Gas 42.0 %      Oxyhemoglobin 93.2 %      Methemoglobin 0.0 %      Carboxyhemoglobin 2.0 %      A-a Gradiant 29.4 mmHg      Temperature 98.6 C      Barometric Pressure for Blood Gas 725 mmHg      Modality Room air     FIO2 21 %     CBC & Differential [[id number]] Collected:  07/23/17 2138    Specimen:  Blood Updated:  07/23/17 2145    Narrative:       The following orders were created for panel order CBC & Differential.  Procedure                               Abnormality         Status                     ---------                               -----------         ------                     CBC Auto Differential[[id number]]        Abnormal            Final result                 Please view results for these tests on the individual orders.    CBC Auto Differential [[id number]]  (Abnormal) Collected:  07/23/17 2138    Specimen:  Blood from Arm, Right Updated:  07/23/17 2145     WBC 14.36 (H) 10*3/mm3      RBC 4.45 10*6/mm3      Hemoglobin 13.2 g/dL      Hematocrit 41.1 %      MCV 92.4 fL      MCH 29.7 pg      MCHC 32.1 (L) g/dL      RDW 16.0 (H) %      RDW-SD 52.2 fl      MPV 12.4 (H) fL      Platelets 213 10*3/mm3      Neutrophil % 70.1 %      Lymphocyte % 12.9 (L) %      Monocyte % 14.8 (H) %      Eosinophil % 0.6 %      Basophil % 0.5 %      Immature Grans % 1.1 (H) %      Neutrophils, Absolute 10.07 (H) 10*3/mm3      Lymphocytes, Absolute 1.85 10*3/mm3      Monocytes, Absolute 2.12 (H) 10*3/mm3      Eosinophils, Absolute 0.09 10*3/mm3      Basophils, Absolute 0.07 10*3/mm3      Immature Grans, Absolute 0.16 (H) 10*3/mm3     Comprehensive Metabolic Panel [[id number]]  (Abnormal) Collected:  07/23/17 2138    Specimen:  Blood from Arm, Right Updated:  07/23/17 2210     Glucose 118 (H) mg/dL      BUN 20 mg/dL      Creatinine 1.14 mg/dL      Sodium  142 mmol/L      Potassium 3.7 mmol/L      Chloride 107 mmol/L      CO2 30.9 mmol/L      Calcium 10.0 mg/dL      Total Protein 8.2 (H) g/dL      Albumin 4.50 g/dL      ALT (SGPT) 8 (L) U/L      AST (SGOT) 16 U/L      Alkaline Phosphatase 90 U/L       Note New Reference Ranges        Total Bilirubin 0.5 mg/dL      eGFR Non African Amer 45 (L) mL/min/1.73      Globulin 3.7 gm/dL      A/G Ratio 1.2 (L) g/dL      BUN/Creatinine Ratio 17.5     Anion Gap 4.1 mmol/L     Narrative:       The MDRD GFR formula is only valid for adults with stable renal function between ages 18 and 70.    Osmolality, Calculated [[id number]]  (Normal) Collected:  07/23/17 2138    Specimen:  Blood from Arm, Right Updated:  07/23/17 2210     Osmolality Calc 286.8 mOsm/kg     CK [[id number]]  (Normal) Collected:  07/23/17 2138    Specimen:  Blood from Arm, Right Updated:  07/23/17 2211     Creatine Kinase 44 U/L     Urinalysis With / Culture If Indicated [[id number]]  (Abnormal) Collected:  07/23/17 2204    Specimen:  Urine from Urine, Clean Catch Updated:  07/23/17 2214     Color, UA Yellow     Appearance, UA Clear     pH, UA 6.5     Specific Gravity, UA 1.008     Glucose, UA Negative     Ketones, UA Negative     Bilirubin, UA Negative     Blood, UA Negative     Protein, UA Negative     Leuk Esterase, UA Trace (A)     Nitrite, UA Negative     Urobilinogen, UA 1.0 E.U./dL    Urinalysis, Microscopic Only [[id number]]  (Abnormal) Collected:  07/23/17 2204    Specimen:  Urine from Urine, Clean Catch Updated:  07/23/17 2214     RBC, UA 0-2 (A) /HPF      WBC, UA 0-2 (A) /HPF      Bacteria, UA None Seen /HPF      Squamous Epithelial Cells, UA 0-2 /HPF      Hyaline Casts, UA None Seen /LPF      Methodology Automated Microscopy    BNP [[id number]]  (Abnormal) Collected:  07/23/17 2138    Specimen:  Blood from Arm, Right Updated:  07/23/17 2216     .0 (H) pg/mL     Troponin [[id number]]  (Abnormal) Collected:  07/23/17 2138    Specimen:  Blood from Arm,  Right Updated:  07/23/17 2217     Troponin I 0.052 (H) ng/mL     Narrative:       Ultra Troponin I Reference Range:         <=0.039 ng/mL: Negative    0.04-0.779 ng/mL: Indeterminate Range. Suspicious of MI.  Clinical correlation required.       >=0.78  ng/mL: Consistent with myocardial injury.  Clinical correlation required.    CK-MB [[id number]]  (Normal) Collected:  07/23/17 2138    Specimen:  Blood from Arm, Right Updated:  07/23/17 2217     CKMB 2.51 ng/mL     Myoglobin, Serum [[id number]]  (Normal) Collected:  07/23/17 2138    Specimen:  Blood from Arm, Right Updated:  07/23/17 2217     Myoglobin 53.0 ng/mL     CK-MB Index [[id number]]  (Abnormal) Collected:  07/23/17 2138    Specimen:  Blood from Arm, Right Updated:  07/23/17 2217     CK-MB Index 5.7 (H) %     Urine Drug Screen [[id number]]  (Normal) Collected:  07/23/17 2204    Specimen:  Urine from Urine, Clean Catch Updated:  07/23/17 2227     Amphetamine Screen, Urine Negative     Barbiturates Screen, Urine Negative     Benzodiazepine Screen, Urine Negative     Cocaine Screen, Urine Negative     Methadone Screen, Urine Negative     Opiate Screen Negative     Phencyclidine (PCP), Urine Negative     THC, Screen, Urine Negative     6-ACETYL MORPHINE Negative     Buprenorphine, Screen, Urine Negative     Oxycodone Screen, Urine Negative    Narrative:       Negative Thresholds For Drugs Screened:                  Amphetamines              1000 ng/ml               Barbiturates               200 ng/ml               Benzodiazepines            200 ng/ml              Cocaine                    300 ng/ml              Methadone                  300 ng/ml              Opiates                    300 ng/ml               Phencyclidine               25 ng/ml               THC                         50 ng/ml              6-Acetyl Morphine           10 ng/ml              Buprenorphine                5 ng/ml              Oxycodone                  300 ng/ml    The reference  range for all drugs tested is negative. This report includes final unconfirmed qualitative results to be used for medical treatment purposes only. Unconfirmed results must not be used for non-medical purposes such as employment or legal testing. Clinical consideration should be applied to any drug of abuse test, especially when unconfirmed quantitative results are used.      Troponin [[id number]]  (Normal) Collected:  07/23/17 2351    Specimen:  Blood from Arm, Right Updated:  07/24/17 0027     Troponin I 0.039 ng/mL     Narrative:       Ultra Troponin I Reference Range:         <=0.039 ng/mL: Negative    0.04-0.779 ng/mL: Indeterminate Range. Suspicious of MI.  Clinical correlation required.       >=0.78  ng/mL: Consistent with myocardial injury.  Clinical correlation required.    CBC & Differential [[id number]] Collected:  07/24/17 0340    Specimen:  Blood Updated:  07/24/17 0358    Narrative:       The following orders were created for panel order CBC & Differential.  Procedure                               Abnormality         Status                     ---------                               -----------         ------                     CBC Auto Differential[[id number]]        Abnormal            Final result                 Please view results for these tests on the individual orders.    CBC Auto Differential [[id number]]  (Abnormal) Collected:  07/24/17 0340    Specimen:  Blood Updated:  07/24/17 0358     WBC 16.18 (H) 10*3/mm3      RBC 4.15 (L) 10*6/mm3      Hemoglobin 12.5 g/dL      Hematocrit 38.7 %      MCV 93.3 fL      MCH 30.1 pg      MCHC 32.3 (L) g/dL      RDW 16.1 (H) %      RDW-SD 52.4 fl      MPV 12.4 (H) fL      Platelets 204 10*3/mm3      Neutrophil % 72.6 %      Lymphocyte % 11.7 (L) %      Monocyte % 12.9 (H) %      Eosinophil % 0.4 %      Basophil % 0.5 %      Immature Grans % 1.9 (H) %      Neutrophils, Absolute 11.73 (H) 10*3/mm3      Lymphocytes, Absolute 1.90 10*3/mm3      Monocytes, Absolute  2.09 (H) 10*3/mm3      Eosinophils, Absolute 0.07 10*3/mm3      Basophils, Absolute 0.08 10*3/mm3      Immature Grans, Absolute 0.31 (H) 10*3/mm3     CK [[id number]]  (Normal) Collected:  07/24/17 0340    Specimen:  Blood Updated:  07/24/17 0420     Creatine Kinase 58 U/L     Troponin [[id number]]  (Normal) Collected:  07/24/17 0340    Specimen:  Blood Updated:  07/24/17 0425     Troponin I 0.038 ng/mL     Narrative:       Ultra Troponin I Reference Range:         <=0.039 ng/mL: Negative    0.04-0.779 ng/mL: Indeterminate Range. Suspicious of MI.  Clinical correlation required.       >=0.78  ng/mL: Consistent with myocardial injury.  Clinical correlation required.    CK-MB [[id number]]  (Normal) Collected:  07/24/17 0340    Specimen:  Blood Updated:  07/24/17 0425     CKMB 2.41 ng/mL     CK-MB Index [[id number]]  (Abnormal) Collected:  07/24/17 0340    Specimen:  Blood Updated:  07/24/17 0425     CK-MB Index 4.2 (H) %     Myoglobin, Serum [[id number]]  (Abnormal) Collected:  07/24/17 0340    Specimen:  Blood Updated:  07/24/17 0440     Myoglobin 115.0 (H) ng/mL           Images:  Imaging Results (last 24 hours)     Procedure Component Value Units Date/Time    XR Chest 2 View [[id number]] Updated:  07/23/17 2126    CT Chest Without Contrast [[id number]] Resulted:  07/23/17 2244     Updated:  07/23/17 2247    CT Head Without Contrast [[id number]] Resulted:  07/23/17 2343     Updated:  07/23/17 2344             Assessment/Plan     Active Problems:    Unstable angina     Gallbladder disease      Continue current treatment.  Workup in progress.  Consults are pending      Florence Zheng MD  07/24/17  6:26 AM

## 2017-07-24 NOTE — ED PROVIDER NOTES
Subjective   History of Present Illness  89 y/o F here w/h/o HTN p/w substernal CP that began shortly prior to presentation. Pt states that the pain is located in the center of her chest. +SOA. Pt has not had a previous MI. Pt denies any abd pain or N/V. Pt states that she has a h/o HTN and is adherent to her outpt medication regimen. Pt was seen > one month ago for fall and back pain and did f/u with spine surgeon in [place]  Review of Systems   Constitutional: Negative for chills, fatigue and fever.   Eyes: Negative for photophobia and visual disturbance.   Respiratory: Positive for shortness of breath. Negative for cough, choking, chest tightness and wheezing.    Cardiovascular: Positive for chest pain.   Gastrointestinal: Negative for abdominal distention, abdominal pain, diarrhea, nausea and vomiting.   Musculoskeletal: Negative for back pain.   Skin: Negative for color change and pallor.   Neurological: Negative for dizziness, syncope and headaches.   All other systems reviewed and are negative.      Past Medical History:   Diagnosis Date   • Hypertension    • Thyroid disease        No Known Allergies    No past surgical history on file.    No family history on file.    Social History     Social History   • Marital status:      Spouse name: N/A   • Number of children: N/A   • Years of education: N/A     Social History Main Topics   • Smoking status: Never Smoker   • Smokeless tobacco: Not on file   • Alcohol use No   • Drug use: No   • Sexual activity: Not on file     Other Topics Concern   • Not on file     Social History Narrative   • No narrative on file           Objective   Physical Exam   Constitutional: She is oriented to person, place, and time. She appears well-developed and well-nourished. She is active.   HENT:   Head: Normocephalic and atraumatic.   Right Ear: Hearing, external ear and ear canal normal.   Left Ear: Hearing, external ear and ear canal normal.   Nose: Nose normal.    Mouth/Throat: Uvula is midline, oropharynx is clear and moist and mucous membranes are normal.   Eyes: Conjunctivae, EOM and lids are normal. Pupils are equal, round, and reactive to light.   Neck: Trachea normal, normal range of motion, full passive range of motion without pain and phonation normal. Neck supple.   Cardiovascular: Normal rate, regular rhythm and normal heart sounds.    Pulmonary/Chest: Effort normal and breath sounds normal.   Abdominal: Soft. Normal appearance. She exhibits no distension. There is no tenderness. There is no rigidity, no rebound, no guarding and negative Agudelo's sign.   Neurological: She is alert and oriented to person, place, and time. GCS eye subscore is 4. GCS verbal subscore is 5. GCS motor subscore is 6.   Skin: Skin is warm, dry and intact.   Psychiatric: She has a normal mood and affect. Her speech is normal and behavior is normal. Judgment and thought content normal. Cognition and memory are normal.   Nursing note and vitals reviewed.      Procedures         ED Course  ED Course      Pt admitted for r/o MI. Pt to go to telemetry under Dr Zheng. Pt with no abdominal symptoms concerning for acute cholecystitis. Pt given dose of zosyn though given her CT findings despite negative labs and negative physical exam. Pt's CP had completely resolved prior to transfer to the floor. Pt did request a CT scan of the head given her h/o trauma and use of blood thinners. While in the ED pt requested that we administer her daily dose of plavix which she normally takes at night. Dr Mcdermott notified of pt's admission and is agreeable with plan to admit to Dr Zheng. Head CT performed at the pt's request was negative for acute bleed but significant for L frontal chronic-subacute infarct, pt has a h/o CVA in the past and has no new neuro deficits or findings.      HEART Score  History: Slightly suspicious (+0)  ECG: Normal (+0)  Age: Greater than or equal to 65 (+2)  Risk Factors: 1 - 2 risk  factors (+1)  Troponin: 1-3x normal limit (+1)  Total: 4         MDM  Number of Diagnoses or Management Options  Closed compression fracture of thoracic vertebra, with delayed healing, subsequent encounter: established and worsening  Elevated troponin: new and requires workup  Multiple gallstones: established and worsening     Amount and/or Complexity of Data Reviewed  Clinical lab tests: reviewed and ordered  Tests in the radiology section of CPT®: reviewed and ordered  Tests in the medicine section of CPT®: ordered  Independent visualization of images, tracings, or specimens: yes    Risk of Complications, Morbidity, and/or Mortality  Presenting problems: high  Diagnostic procedures: high  Management options: high    Patient Progress  Patient progress: stable      Final diagnoses:   Elevated troponin   Closed compression fracture of thoracic vertebra, with delayed healing, subsequent encounter   Multiple gallstones            Sree Tamayo MD  07/23/17 2316       Sree Tamayo MD  07/24/17 0002       Sree Tamayo MD  07/24/17 0016

## 2017-07-24 NOTE — PROGRESS NOTES
Discharge Planning Assessment   [facility]     Patient Name: Evi Smith  MRN: [id number]  Today's Date: 7/24/2017    Admit Date: 7/23/2017          Discharge Needs Assessment       07/24/17 1148    Living Environment    Lives With child(belkys), adult    Living Arrangements house    Transportation Available car    Living Environment    Provides Primary Care For no one    Primary Care Provided By child(belkys) (specify)    Quality Of Family Relationships supportive    Able to Return to Prior Living Arrangements yes            Discharge Plan       07/24/17 1149    Case Management/Social Work Plan    Plan Pt admitted on 7/23/17.  SS received consult per [facility] [place] planning.  SS spoke with pt on this date.  Pt lives at home with daughter and plans to return home at discharge.  Pt currently does not utilize home health services.  Pt currently utilizes walker via unknown provider and wheelchair via family.  Pt's PCP is Dr. Zheng.  SS will follow and assist with discharge needs.    Patient/Family In Agreement With Plan yes        Discharge Placement     No information found                Demographic Summary       07/24/17 1147    Referral Information    Referral Source nursing    Reason For Consult discharge planning            Functional Status     None            Psychosocial     None            Abuse/Neglect     None            Legal     None            Substance Abuse     None            Patient Forms     None          Maggie Mo

## 2017-07-24 NOTE — PLAN OF CARE
Problem: Pressure Ulcer Risk (Tay Scale) (Adult,Obstetrics,Pediatric)  Goal: Identify Related Risk Factors and Signs and Symptoms  Outcome: Ongoing (interventions implemented as appropriate)

## 2017-07-25 ENCOUNTER — APPOINTMENT (OUTPATIENT)
Dept: NUCLEAR MEDICINE | Facility: HOSPITAL | Age: 82
End: 2017-07-25

## 2017-07-25 ENCOUNTER — APPOINTMENT (OUTPATIENT)
Dept: CARDIOLOGY | Facility: HOSPITAL | Age: 82
End: 2017-07-25

## 2017-07-25 LAB
ANION GAP SERPL CALCULATED.3IONS-SCNC: 7.7 MMOL/L (ref 3.6–11.2)
BASOPHILS # BLD AUTO: 0.05 10*3/MM3 (ref 0–0.3)
BASOPHILS NFR BLD AUTO: 0.3 % (ref 0–2)
BH CV STRESS BP STAGE 1: NORMAL
BH CV STRESS BP STAGE 2: NORMAL
BH CV STRESS COMMENTS STAGE 1: NORMAL
BH CV STRESS COMMENTS STAGE 2: NORMAL
BH CV STRESS DOSE REGADENOSON STAGE 1: 0.4
BH CV STRESS DURATION MIN STAGE 1: 0
BH CV STRESS DURATION MIN STAGE 2: 4
BH CV STRESS DURATION SEC STAGE 1: 15
BH CV STRESS DURATION SEC STAGE 2: 0
BH CV STRESS HR STAGE 1: 96
BH CV STRESS HR STAGE 2: 84
BH CV STRESS PROTOCOL 1: NORMAL
BH CV STRESS RECOVERY BP: NORMAL MMHG
BH CV STRESS RECOVERY HR: 84 BPM
BH CV STRESS STAGE 1: 1
BH CV STRESS STAGE 2: 2
BUN BLD-MCNC: 15 MG/DL (ref 7–21)
BUN/CREAT SERPL: 14.6 (ref 7–25)
CALCIUM SPEC-SCNC: 9.2 MG/DL (ref 7.7–10)
CHLORIDE SERPL-SCNC: 109 MMOL/L (ref 99–112)
CHOLEST SERPL-MCNC: 111 MG/DL (ref 0–200)
CK MB SERPL-CCNC: 0.94 NG/ML (ref 0–5)
CK MB SERPL-CCNC: 0.96 NG/ML (ref 0–5)
CK MB SERPL-RTO: 1.4 % (ref 0–3)
CK MB SERPL-RTO: 2 % (ref 0–3)
CK SERPL-CCNC: 48 U/L (ref 24–173)
CK SERPL-CCNC: 67 U/L (ref 24–173)
CO2 SERPL-SCNC: 24.3 MMOL/L (ref 24.3–31.9)
CREAT BLD-MCNC: 1.03 MG/DL (ref 0.43–1.29)
DEPRECATED RDW RBC AUTO: 54.2 FL (ref 37–54)
EOSINOPHIL # BLD AUTO: 0.13 10*3/MM3 (ref 0–0.7)
EOSINOPHIL NFR BLD AUTO: 0.9 % (ref 0–7)
ERYTHROCYTE [DISTWIDTH] IN BLOOD BY AUTOMATED COUNT: 16.3 % (ref 11.5–14.5)
GFR SERPL CREATININE-BSD FRML MDRD: 51 ML/MIN/1.73
GLUCOSE BLD-MCNC: 106 MG/DL (ref 70–110)
HCT VFR BLD AUTO: 38.6 % (ref 37–47)
HDLC SERPL-MCNC: 37 MG/DL (ref 60–100)
HGB BLD-MCNC: 12.5 G/DL (ref 12–16)
IMM GRANULOCYTES # BLD: 0.17 10*3/MM3 (ref 0–0.03)
IMM GRANULOCYTES NFR BLD: 1.1 % (ref 0–0.5)
LDLC SERPL CALC-MCNC: 47 MG/DL (ref 0–100)
LDLC/HDLC SERPL: 1.28 {RATIO}
LV EF NUC BP: 87 %
LYMPHOCYTES # BLD AUTO: 2 10*3/MM3 (ref 1–3)
LYMPHOCYTES NFR BLD AUTO: 13.3 % (ref 16–46)
MAXIMAL PREDICTED HEART RATE: 132 BPM
MCH RBC QN AUTO: 30.6 PG (ref 27–33)
MCHC RBC AUTO-ENTMCNC: 32.4 G/DL (ref 33–37)
MCV RBC AUTO: 94.6 FL (ref 80–94)
MONOCYTES # BLD AUTO: 2.13 10*3/MM3 (ref 0.1–0.9)
MONOCYTES NFR BLD AUTO: 14.2 % (ref 0–12)
MYOGLOBIN SERPL-MCNC: 41 NG/ML (ref 0–109)
MYOGLOBIN SERPL-MCNC: 52 NG/ML (ref 0–109)
NEUTROPHILS # BLD AUTO: 10.56 10*3/MM3 (ref 1.4–6.5)
NEUTROPHILS NFR BLD AUTO: 70.2 % (ref 40–75)
OSMOLALITY SERPL CALC.SUM OF ELEC: 282.5 MOSM/KG (ref 273–305)
PERCENT MAX PREDICTED HR: 72.73 %
PLATELET # BLD AUTO: 192 10*3/MM3 (ref 130–400)
PMV BLD AUTO: 12.7 FL (ref 6–10)
POTASSIUM BLD-SCNC: 3.8 MMOL/L (ref 3.5–5.3)
RBC # BLD AUTO: 4.08 10*6/MM3 (ref 4.2–5.4)
SODIUM BLD-SCNC: 141 MMOL/L (ref 135–153)
STRESS BASELINE BP: NORMAL MMHG
STRESS BASELINE HR: 74 BPM
STRESS PERCENT HR: 86 %
STRESS POST PEAK BP: NORMAL MMHG
STRESS POST PEAK HR: 96 BPM
STRESS TARGET HR: 112 BPM
TRIGL SERPL-MCNC: 134 MG/DL (ref 0–150)
VLDLC SERPL-MCNC: 26.8 MG/DL
WBC NRBC COR # BLD: 15.04 10*3/MM3 (ref 4.5–12.5)

## 2017-07-25 PROCEDURE — 25010000002 CEFTRIAXONE: Performed by: INTERNAL MEDICINE

## 2017-07-25 PROCEDURE — 82550 ASSAY OF CK (CPK): CPT | Performed by: FAMILY MEDICINE

## 2017-07-25 PROCEDURE — 83874 ASSAY OF MYOGLOBIN: CPT | Performed by: FAMILY MEDICINE

## 2017-07-25 PROCEDURE — 0 TECHNETIUM SESTAMIBI: Performed by: INTERNAL MEDICINE

## 2017-07-25 PROCEDURE — 80048 BASIC METABOLIC PNL TOTAL CA: CPT | Performed by: INTERNAL MEDICINE

## 2017-07-25 PROCEDURE — 82553 CREATINE MB FRACTION: CPT | Performed by: FAMILY MEDICINE

## 2017-07-25 PROCEDURE — 99232 SBSQ HOSP IP/OBS MODERATE 35: CPT | Performed by: INTERNAL MEDICINE

## 2017-07-25 PROCEDURE — 80061 LIPID PANEL: CPT | Performed by: INTERNAL MEDICINE

## 2017-07-25 PROCEDURE — 25010000002 ENOXAPARIN PER 10 MG: Performed by: INTERNAL MEDICINE

## 2017-07-25 PROCEDURE — 78452 HT MUSCLE IMAGE SPECT MULT: CPT | Performed by: INTERNAL MEDICINE

## 2017-07-25 PROCEDURE — 93017 CV STRESS TEST TRACING ONLY: CPT

## 2017-07-25 PROCEDURE — 94799 UNLISTED PULMONARY SVC/PX: CPT

## 2017-07-25 PROCEDURE — 93018 CV STRESS TEST I&R ONLY: CPT | Performed by: INTERNAL MEDICINE

## 2017-07-25 PROCEDURE — 25010000002 REGADENOSON 0.4 MG/5ML SOLUTION: Performed by: INTERNAL MEDICINE

## 2017-07-25 PROCEDURE — 25010000002 AMINOPHYLLINE PER 250 MG: Performed by: INTERNAL MEDICINE

## 2017-07-25 PROCEDURE — 85025 COMPLETE CBC W/AUTO DIFF WBC: CPT | Performed by: INTERNAL MEDICINE

## 2017-07-25 PROCEDURE — 78452 HT MUSCLE IMAGE SPECT MULT: CPT

## 2017-07-25 PROCEDURE — A9500 TC99M SESTAMIBI: HCPCS | Performed by: INTERNAL MEDICINE

## 2017-07-25 RX ORDER — ISOSORBIDE MONONITRATE 30 MG/1
30 TABLET, EXTENDED RELEASE ORAL
Status: DISCONTINUED | OUTPATIENT
Start: 2017-07-26 | End: 2017-07-27 | Stop reason: HOSPADM

## 2017-07-25 RX ORDER — AMLODIPINE BESYLATE 10 MG/1
10 TABLET ORAL DAILY
Status: DISCONTINUED | OUTPATIENT
Start: 2017-07-25 | End: 2017-07-27

## 2017-07-25 RX ORDER — AMINOPHYLLINE DIHYDRATE 25 MG/ML
100 INJECTION, SOLUTION INTRAVENOUS
Status: COMPLETED | OUTPATIENT
Start: 2017-07-25 | End: 2017-07-25

## 2017-07-25 RX ADMIN — CLONIDINE HYDROCHLORIDE 0.2 MG: 0.2 TABLET ORAL at 16:01

## 2017-07-25 RX ADMIN — CARVEDILOL 6.25 MG: 6.25 TABLET, FILM COATED ORAL at 21:02

## 2017-07-25 RX ADMIN — LISINOPRIL 40 MG: 10 TABLET ORAL at 15:55

## 2017-07-25 RX ADMIN — CLOPIDOGREL BISULFATE 75 MG: 75 TABLET, FILM COATED ORAL at 21:02

## 2017-07-25 RX ADMIN — HYDRALAZINE HYDROCHLORIDE 50 MG: 50 TABLET ORAL at 16:00

## 2017-07-25 RX ADMIN — AMINOPHYLLINE 100 MG: 25 INJECTION, SOLUTION INTRAVENOUS at 14:54

## 2017-07-25 RX ADMIN — SODIUM CHLORIDE 75 ML/HR: 4.5 INJECTION, SOLUTION INTRAVENOUS at 06:27

## 2017-07-25 RX ADMIN — HYDRALAZINE HYDROCHLORIDE 50 MG: 50 TABLET ORAL at 22:36

## 2017-07-25 RX ADMIN — SPIRONOLACTONE 25 MG: 25 TABLET ORAL at 15:55

## 2017-07-25 RX ADMIN — AMLODIPINE BESYLATE 10 MG: 10 TABLET ORAL at 16:01

## 2017-07-25 RX ADMIN — LEVOTHYROXINE SODIUM 100 MCG: 100 TABLET ORAL at 06:23

## 2017-07-25 RX ADMIN — REGADENOSON 0.4 MG: 0.08 INJECTION, SOLUTION INTRAVENOUS at 14:50

## 2017-07-25 RX ADMIN — ENOXAPARIN SODIUM 30 MG: 30 INJECTION SUBCUTANEOUS at 16:01

## 2017-07-25 RX ADMIN — SODIUM CHLORIDE 75 ML/HR: 4.5 INJECTION, SOLUTION INTRAVENOUS at 21:02

## 2017-07-25 RX ADMIN — TECHNETIUM TC-99M SESTAMIBI 1 DOSE: 1 INJECTION INTRAVENOUS at 14:50

## 2017-07-25 RX ADMIN — TECHNETIUM TC-99M SESTAMIBI 1 DOSE: 1 INJECTION INTRAVENOUS at 13:45

## 2017-07-25 RX ADMIN — FAMOTIDINE 20 MG: 20 TABLET ORAL at 15:54

## 2017-07-25 RX ADMIN — CLONIDINE HYDROCHLORIDE 0.2 MG: 0.2 TABLET ORAL at 06:23

## 2017-07-25 RX ADMIN — PANTOPRAZOLE SODIUM 40 MG: 40 TABLET, DELAYED RELEASE ORAL at 06:23

## 2017-07-25 RX ADMIN — LABETALOL HYDROCHLORIDE 20 MG: 5 INJECTION, SOLUTION INTRAVENOUS at 19:52

## 2017-07-25 RX ADMIN — ASPIRIN 81 MG: 81 TABLET ORAL at 21:02

## 2017-07-25 RX ADMIN — OXYCODONE HYDROCHLORIDE AND ACETAMINOPHEN 500 MG: 500 TABLET ORAL at 15:55

## 2017-07-25 RX ADMIN — ATORVASTATIN CALCIUM 20 MG: 20 TABLET, FILM COATED ORAL at 21:02

## 2017-07-25 RX ADMIN — CEFTRIAXONE 1 G: 1 INJECTION, POWDER, FOR SOLUTION INTRAMUSCULAR; INTRAVENOUS at 08:18

## 2017-07-25 RX ADMIN — CLONIDINE HYDROCHLORIDE 0.2 MG: 0.2 TABLET ORAL at 22:36

## 2017-07-25 RX ADMIN — HYDRALAZINE HYDROCHLORIDE 50 MG: 50 TABLET ORAL at 06:23

## 2017-07-25 NOTE — NURSING NOTE
[facility] ADMISSION NOTE:  Patient enrolled in the [facility] program to assist with education and support during transition home from hospital. [facility]QU[facility] home  will see patient at home within 48 hours of discharge and will follow up with patient in home and telephonically for 6 weeks post discharge under the Charleen Model.    Clinical Assessment Instrument Scores:  Short Portable Mental Status Questionnaire- 4  Geriatric Depression Scale-6  Instrumental Activities of Daily Living-2  VAZQUEZ Activities of Daily Living-6  Overall Quality of Life- 2  Subjective Health Rating- 1  Symptom Bother Scale-19  Generalized Anxiety Scale-1  Health Literacy Test- 1-3

## 2017-07-25 NOTE — PROGRESS NOTES
LOS: 0 days   Patient Care Team:  CAROLINA Stevenson as PCP - General (Family Medicine)    Brief history of present illness:   Patient is a 88 y.o. female presents with chest pain and shortness of breath last 2-3 days.  Symptoms started abruptly and has been present intermittently pain is retrosternal and dull increases with activity.  Shortness of breath has been mild without any cough or expectoration.  Patient does have multiple risk factor for coronary artery disease.  Her workup in the emergency room showed borderline troponin she was admitted to the hospital for further care.  Patient also has leukocytosis.  She has some nausea and abdominal tenderness and CT study showed gallstone, patient was empirically started on IV antibiotic.  We will consult cardiology first  to rule out ischemic heart disease, if the workup is negative then she may need evaluation by gastroenterologist or surgeon for gallstone.         Subjective     Patient is feeling fair.  Denying any chest pain palpitation or shortness of breath.  History taken from: patient and chart    Interval History:     No significant changes to patients past history, family history, and social history.     Review of Systems:   Review of Systems - General ROS: negative for - fever, weight gain or weight loss.  Positive for weakness  Psychological ROS: negative for - anxiety, disorientation or hallucinations  Ophthalmic ROS: negative for - blurry vision or double vision  ENT ROS: negative for - hearing change or vertigo  Allergy and Immunology ROS: negative for - hives, insect bite sensitivity or immunodeficiency  Hematological and Lymphatic ROS: negative for - bruising or swollen lymph nodes  Endocrine ROS: negative for - galactorrhea or hair pattern changes, no polyuria or polydipsia.  Respiratory ROS: negative for - hemoptysis or sputum changes, cough or wheezing.  Positive for shortness of breath  Cardiovascular ROS: negative for - loss of  consciousnes or palpitation.  Positive for chest  Gastrointestinal ROS: negative for - melena or stool incontinence, nausea vomiting , diarrhea or abdominal pain  Genito-Urinary ROS: negative for - genital ulcers or pelvic pain or dysuria  Musculoskeletal ROS: negative for - gait disturbance or muscular weakness  Neurological ROS: negative for - bowel and bladder control changes or seizures, no focal weakness  Dermatological ROS: negative for hair changes and nail changes            Physical Exam:    Vital Signs  Temp:  [98 °F (36.7 °C)-98.6 °F (37 °C)] 98.6 °F (37 °C)  Heart Rate:  [69-92] 69  Resp:  [18] 18  BP: (130-195)/(56-92) 164/70   General Appearance:     Alert, cooperative, in no acute distress   Head:  Normocephalic, without obvious abnormality, atraumatic   Eyes:          Lids and lashes normal, conjunctivae and sclerae normal, no icterus, no pallor, corneas clear, PERRLA   Ears:  Ears appear intact with no abnormalities noted   Throat: No oral lesions, no thrush, oral mucosa moist   Neck: No adenopathy, supple, trachea midline, no thyromegaly, no carotid bruit, no JVD   Back:   no skin lesions, no erythema or scars, no tenderness to percussion or palpation.     Lungs:   Clear to auscultation,respirations regular, even and               Unlabored, no wheezing     Heart:  Regular rhythm and normal rate, normal S1 and S2, no        murmur, no gallop, no rub, no click   Abdomen:   Normal bowel sounds, no masses, no organomegaly, soft   non-tender, non-distended, no guarding, no rebound   tenderness   Extremities: Moves all extremities well, no edema, no cyanosis, no         redness   Pulses: Pulses palpable and equal bilaterally   Skin: No bleeding, bruising or rash   Lymph nodes: No palpable adenopathy   Neurologic: Cranial nerves 2 - 12 grossly intact, sensation intact, overall normal motor strength, DTR present and equal bilaterally        Labs:    Results from last 7 days  Lab Units 07/25/17  0101  07/24/17  0340 07/23/17  2138   WBC 10*3/mm3 15.04* 16.18* 14.36*   HEMOGLOBIN g/dL 12.5 12.5 13.2   HEMATOCRIT % 38.6 38.7 41.1   MCV fL 94.6* 93.3 92.4   MCHC g/dL 32.4* 32.3* 32.1*   PLATELETS 10*3/mm3 192 204 213       Results from last 7 days  Lab Units 07/23/17  2058   PH, ARTERIAL pH units 7.467*   PO2 ART mm Hg 71.2*   PCO2, ARTERIAL mm Hg 34.9*   HCO3 ART mmol/L 24.7       Results from last 7 days  Lab Units 07/25/17  0109 07/23/17  2138   SODIUM mmol/L 141 142   POTASSIUM mmol/L 3.8 3.7   CHLORIDE mmol/L 109 107   CO2 mmol/L 24.3 30.9   BUN mg/dL 15 20   CREATININE mg/dL 1.03 1.14   EGFR IF NONAFRICN AM mL/min/1.73 51* 45*   CALCIUM mg/dL 9.2 10.0   GLUCOSE mg/dL 106 118*   ALBUMIN g/dL  --  4.50   BILIRUBIN mg/dL  --  0.5   ALK PHOS U/L  --  90   AST (SGOT) U/L  --  16   ALT (SGPT) U/L  --  8*   Estimated Creatinine Clearance: 30.2 mL/min (by C-G formula based on Cr of 1.03).  No results found for: AMMONIA  Lab Results   Component Value Date    CKTOTAL 67 07/25/2017    CKMB 0.94 07/25/2017    CKMBINDEX 1.4 07/25/2017    TROPONINI 0.047 (H) 07/24/2017         No results found for: HGBA1C  No results found for: TSH, FREET4  No results found for: PREGTESTUR, PREGSERUM, HCG, HCGQUANT    Pain Management Panel     Pain Management Panel Latest Ref Rng & Units 7/23/2017    AMPHETAMINES SCREEN, URINE Negative Negative    BARBITURATES SCREEN Negative Negative    BENZODIAZEPINE SCREEN, URINE Negative Negative    BUPRENORPHINE Negative Negative    COCAINE SCREEN, URINE Negative Negative    METHADONE SCREEN, URINE Negative Negative            Results from last 7 days  Lab Units 07/25/17  0109   CHOLESTEROL mg/dL 111   TRIGLYCERIDES mg/dL 134   HDL CHOL mg/dL 37*       Cultures:  @LASTLAB3(BLOODCX:*,URINECX:*,WOUNDCX:*,MRSACX:*,RESPCX:*,STOOLCX:*)@    Lab Results   Component Value Date    CKTOTAL 67 07/25/2017    CKTOTAL 50 07/24/2017    CKTOTAL 61 07/24/2017    CKMB 0.94 07/25/2017    CKMB 1.42 07/24/2017    CKMB 2.39  07/24/2017    CKMBINDEX 1.4 07/25/2017    CKMBINDEX 2.8 07/24/2017    CKMBINDEX 3.9 (H) 07/24/2017    TROPONINI 0.047 (H) 07/24/2017    TROPONINI 0.038 07/24/2017    TROPONINI 0.039 07/23/2017             Images:  Imaging Results (last 24 hours)     Procedure Component Value Units Date/Time    XR Chest 2 View [[id number]] Collected:  07/24/17 0735     Updated:  07/24/17 0737    Narrative:       EXAMINATION: XR CHEST 2 VW-      CLINICAL INDICATION:     chest pain     TECHNIQUE:  XR CHEST 2 VW-      COMPARISON: NONE      FINDINGS:   Calcified granuloma left lower lobe. Lungs otherwise clear.   Heart and mediastinal contours are unremarkable.   No pneumothorax.   No pleural effusion.   No acute osseous findings.  Aortic calcifications are noted.       Impression:       No radiographic evidence of acute cardiac or pulmonary  disease.     This report was finalized on 7/24/2017 7:35 AM by Dr. Vishal Hartman MD.       CT Head Without Contrast [[id number]] Collected:  07/24/17 0736     Updated:  07/24/17 0739    Narrative:       EXAMINATION: CT HEAD WO CONTRAST-      CLINICAL INDICATION:     h/o trauma on blood thinner; R79.89-Other  specified abnormal findings of blood chemistry; S22.000G-Wedge  compression fracture of unspecified thoracic vertebra, subsequent  encounter for fracture with delayed healing; K80.20-Calculus of  gallbladder without cholecystitis without obstruction     COMPARISON:    06/05/2017     Technique: Multiple CT axial images were obtained through the level of  the brain without IV contrast administration. Reformatted images in the  coronal and/or sagittal plane(s) were generated from the axial data set  to facilitate diagnostic accuracy and/or surgical planning.     Radiation dose reduction techniques were utilized per ALARA protocol.  Automated exposure control was initiated through either or CareDose or  DoseRigNetformx software packages by  protocol.       DOSE (DLP mGy-cm): 1070.1 mGy.cm      FINDINGS:   Stable advanced chronic small vessel ischemic disease.  Chronic appearing left side infarcts also stable. No acute intracranial  abnormality. No midline shift or mass effect. No hydrocephalus or  intracranial hemorrhage. There is no CT evidence of acute vascular  territory infarct.  Bone windows show no acute osseous abnormality.       Impression:       1. Stable exam demonstrating no CT evidence of acute intracranial  abnormality.  2. Stable advanced chronic small vessel ischemic disease and chronic  appearing left frontal region infarct.  3. Stable left mastoid effusion.     This report was finalized on 7/24/2017 7:37 AM by Dr. Vishal Hartman MD.       CT Chest Without Contrast [[id number]] Collected:  07/24/17 0737     Updated:  07/24/17 0742    Narrative:       EXAM: CT CHEST WO CONTRAST-              CLINICAL INDICATION:abn CXR      COMPARISON: NONE.     TECHNIQUE: Multiple axial CT images were obtained from lung apex through  upper abdomen with administration of IV contrast. Reformatted images in  the coronal and/or sagittal plane(s) were generated from the axial data  set to facilitate diagnostic accuracy and/or surgical planning.  Oral Contrast:NONE.     Radiation dose reduction techniques were utilized per ALARA protocol.  Automated exposure control was initiated through either or CareDose or  [email or web address].TV software packages by  protocol.    DOSE (DLP mGy-cm): 312.54 mGy.cm        FINDINGS:     LUNGS: Chronic appearing interstitial lung changes with underlying mild  COPD. Left upper lobe scarring. Calcified granuloma left lower lobe.     HEART: Moderate-advanced coronary vascular calcifications.     PERICARDIUM: No effusion.     MEDIASTINUM: No masses. No enlarged lymph nodes.  No fluid collections.     PLEURA: No pleural effusion. No pleural mass or abnormal calcification.     MAJOR AIRWAYS: Clear. No intrinsic mass.     VASCULATURE: No evidence of aneurysm.     VISUALIZED UPPER  ABDOMEN: Numerous layering gallstones.     OTHER: None.     BONES: Since the previous exam, compression deformity with near complete  height loss noted of T11. Compression deformity of T1 with only minimal  height loss but no bony retropulsion.       Impression:          1. Stable chronic appearing pulmonary changes.  2. Development of compression deformities of T1 and T11 new since the  previous exam.  3. No acute thoracic findings otherwise noted.     This report was finalized on 7/24/2017 7:40 AM by Dr. Vishal Hartman MD.              Active Meds      Current Facility-Administered Medications:   •  amLODIPine (NORVASC) tablet 10 mg, 10 mg, Oral, Daily, Florence Zheng MD  •  aspirin EC tablet 81 mg, 81 mg, Oral, Nightly, Florence Zheng MD, 81 mg at 07/24/17 2112  •  atorvastatin (LIPITOR) tablet 20 mg, 20 mg, Oral, Nightly, Florence hZeng MD, 20 mg at 07/24/17 2112  •  carvedilol (COREG) tablet 6.25 mg, 6.25 mg, Oral, Q12H, Sheldon Caldwell MD, 6.25 mg at 07/24/17 2112  •  cefTRIAXone (ROCEPHIN) 1 g/100 mL 0.9% NS (MBP), 1 g, Intravenous, Q24H, Florence Zheng MD  •  cetirizine (zyrTEC) tablet 5 mg, 5 mg, Oral, Daily PRN, Florence Zheng MD  •  CloNIDine (CATAPRES) tablet 0.2 mg, 0.2 mg, Oral, Q8H, Florence Zheng MD, 0.2 mg at 07/25/17 0623  •  clopidogrel (PLAVIX) tablet 75 mg, 75 mg, Oral, Nightly, Florence Zheng MD, 75 mg at 07/24/17 2112  •  enoxaparin (LOVENOX) syringe 30 mg, 30 mg, Subcutaneous, Daily, Florence Zheng MD  •  famotidine (PEPCID) tablet 20 mg, 20 mg, Oral, Daily, Florence Zheng MD  •  hydrALAZINE (APRESOLINE) tablet 50 mg, 50 mg, Oral, Q8H, Sheldon Caldwell MD, 50 mg at 07/25/17 0623  •  labetalol (NORMODYNE,TRANDATE) injection 20 mg, 20 mg, Intravenous, Q6H PRN, Samuel Duane Kreis, MD, 20 mg at 07/24/17 1110  •  levothyroxine (SYNTHROID, LEVOTHROID) tablet 100 mcg, 100 mcg, Oral, Q AM, Florence Zheng MD, 100 mcg at 07/25/17 0623  •  lisinopril  (PRINIVIL,ZESTRIL) tablet 40 mg, 40 mg, Oral, Q24H, Florence Zheng MD  •  nitroglycerin (NITROSTAT) SL tablet 0.4 mg, 0.4 mg, Sublingual, Q5 Min PRN, Sree Tamayo MD  •  pantoprazole (PROTONIX) EC tablet 40 mg, 40 mg, Oral, QAM, Florence Zheng MD, 40 mg at 07/25/17 0623  •  Pharmacy Meds to Bed Consult, , Does not apply, Daily, Kitty Nelson, [facility]  •  polyethylene glycol (MIRALAX) powder 17 g, 17 g, Oral, Daily PRN, Florence Zheng MD  •  sodium chloride 0.45 % infusion, 75 mL/hr, Intravenous, Continuous, Samuel Duane Kreis, MD, Last Rate: 75 mL/hr at 07/25/17 0627, 75 mL/hr at 07/25/17 0627  •  Insert peripheral IV, , , Once **AND** sodium chloride 0.9 % flush 10 mL, 10 mL, Intravenous, PRN, EZIO Batista  •  spironolactone (ALDACTONE) tablet 25 mg, 25 mg, Oral, Daily, lForence Zheng MD  •  vitamin C (ASCORBIC ACID) tablet 500 mg, 500 mg, Oral, Daily, Florence Zheng MD    Assessment/Plan     Active Problems:    Unstable angina     Gallbladder disease     Sepsis      Accelerated hypertension        Cardiology consult appreciated.  Patient will be undergoing cardiac stress testing for evaluation of cardiac ischemia.  Blood pressure is improving though is still high, I will increase amlodipine dosage.  Patient has leukocytosis, etiology unclear.  Could be UTI?  I will start broad-spectrum IV antibiotic coverage.  We will start ambulation.          Florence Zheng MD  07/25/17  6:54 AM

## 2017-07-25 NOTE — PLAN OF CARE
Problem: Patient Care Overview (Adult)  Goal: Plan of Care Review  Outcome: Ongoing (interventions implemented as appropriate)    Problem: Pain, Acute (Adult)  Goal: Identify Related Risk Factors and Signs and Symptoms  Outcome: Ongoing (interventions implemented as appropriate)  Goal: Acceptable Pain Control/Comfort Level  Outcome: Ongoing (interventions implemented as appropriate)    Problem: Pressure Ulcer Risk (Tay Scale) (Adult,Obstetrics,Pediatric)  Goal: Identify Related Risk Factors and Signs and Symptoms  Outcome: Ongoing (interventions implemented as appropriate)  Goal: Skin Integrity  Outcome: Ongoing (interventions implemented as appropriate)    Problem: Fall Risk (Adult)  Goal: Identify Related Risk Factors and Signs and Symptoms  Outcome: Ongoing (interventions implemented as appropriate)  Goal: Absence of Falls  Outcome: Ongoing (interventions implemented as appropriate)    Problem: Cardiac Output, Decreased (Adult)  Goal: Identify Related Risk Factors and Signs and Symptoms  Outcome: Ongoing (interventions implemented as appropriate)  Goal: Adequate Cardiac Output/Effective Tissue Perfusion  Outcome: Ongoing (interventions implemented as appropriate)

## 2017-07-26 LAB
ANION GAP SERPL CALCULATED.3IONS-SCNC: 1.2 MMOL/L (ref 3.6–11.2)
BASOPHILS # BLD AUTO: 0.05 10*3/MM3 (ref 0–0.3)
BASOPHILS NFR BLD AUTO: 0.4 % (ref 0–2)
BUN BLD-MCNC: 11 MG/DL (ref 7–21)
BUN/CREAT SERPL: 12.9 (ref 7–25)
CALCIUM SPEC-SCNC: 9.3 MG/DL (ref 7.7–10)
CHLORIDE SERPL-SCNC: 110 MMOL/L (ref 99–112)
CK MB SERPL-CCNC: 1.28 NG/ML (ref 0–5)
CK MB SERPL-CCNC: 1.4 NG/ML (ref 0–5)
CK MB SERPL-CCNC: 1.73 NG/ML (ref 0–5)
CK MB SERPL-RTO: 2.8 % (ref 0–3)
CK MB SERPL-RTO: 3.3 % (ref 0–3)
CK MB SERPL-RTO: 4.2 % (ref 0–3)
CK SERPL-CCNC: 41 U/L (ref 24–173)
CK SERPL-CCNC: 42 U/L (ref 24–173)
CK SERPL-CCNC: 45 U/L (ref 24–173)
CO2 SERPL-SCNC: 30.8 MMOL/L (ref 24.3–31.9)
CREAT BLD-MCNC: 0.85 MG/DL (ref 0.43–1.29)
DEPRECATED RDW RBC AUTO: 52.9 FL (ref 37–54)
EOSINOPHIL # BLD AUTO: 0.08 10*3/MM3 (ref 0–0.7)
EOSINOPHIL NFR BLD AUTO: 0.7 % (ref 0–7)
ERYTHROCYTE [DISTWIDTH] IN BLOOD BY AUTOMATED COUNT: 16.2 % (ref 11.5–14.5)
GFR SERPL CREATININE-BSD FRML MDRD: 63 ML/MIN/1.73
GLUCOSE BLD-MCNC: 144 MG/DL (ref 70–110)
HCT VFR BLD AUTO: 39 % (ref 37–47)
HGB BLD-MCNC: 12.6 G/DL (ref 12–16)
IMM GRANULOCYTES # BLD: 0.19 10*3/MM3 (ref 0–0.03)
IMM GRANULOCYTES NFR BLD: 1.6 % (ref 0–0.5)
LYMPHOCYTES # BLD AUTO: 1.7 10*3/MM3 (ref 1–3)
LYMPHOCYTES NFR BLD AUTO: 14.7 % (ref 16–46)
MCH RBC QN AUTO: 30.2 PG (ref 27–33)
MCHC RBC AUTO-ENTMCNC: 32.3 G/DL (ref 33–37)
MCV RBC AUTO: 93.5 FL (ref 80–94)
MONOCYTES # BLD AUTO: 1.65 10*3/MM3 (ref 0.1–0.9)
MONOCYTES NFR BLD AUTO: 14.2 % (ref 0–12)
MYOGLOBIN SERPL-MCNC: 47 NG/ML (ref 0–109)
MYOGLOBIN SERPL-MCNC: 50 NG/ML (ref 0–109)
NEUTROPHILS # BLD AUTO: 7.93 10*3/MM3 (ref 1.4–6.5)
NEUTROPHILS NFR BLD AUTO: 68.4 % (ref 40–75)
OSMOLALITY SERPL CALC.SUM OF ELEC: 285 MOSM/KG (ref 273–305)
PLATELET # BLD AUTO: 187 10*3/MM3 (ref 130–400)
PMV BLD AUTO: 12.5 FL (ref 6–10)
POTASSIUM BLD-SCNC: 3.4 MMOL/L (ref 3.5–5.3)
RBC # BLD AUTO: 4.17 10*6/MM3 (ref 4.2–5.4)
SODIUM BLD-SCNC: 142 MMOL/L (ref 135–153)
TROPONIN I SERPL-MCNC: 0.03 NG/ML
WBC NRBC COR # BLD: 11.6 10*3/MM3 (ref 4.5–12.5)

## 2017-07-26 PROCEDURE — 82550 ASSAY OF CK (CPK): CPT | Performed by: FAMILY MEDICINE

## 2017-07-26 PROCEDURE — 83874 ASSAY OF MYOGLOBIN: CPT | Performed by: FAMILY MEDICINE

## 2017-07-26 PROCEDURE — 82553 CREATINE MB FRACTION: CPT | Performed by: INTERNAL MEDICINE

## 2017-07-26 PROCEDURE — 80048 BASIC METABOLIC PNL TOTAL CA: CPT | Performed by: INTERNAL MEDICINE

## 2017-07-26 PROCEDURE — 82553 CREATINE MB FRACTION: CPT | Performed by: FAMILY MEDICINE

## 2017-07-26 PROCEDURE — 99232 SBSQ HOSP IP/OBS MODERATE 35: CPT | Performed by: INTERNAL MEDICINE

## 2017-07-26 PROCEDURE — 82550 ASSAY OF CK (CPK): CPT | Performed by: INTERNAL MEDICINE

## 2017-07-26 PROCEDURE — 25010000002 ENOXAPARIN PER 10 MG: Performed by: INTERNAL MEDICINE

## 2017-07-26 PROCEDURE — 93005 ELECTROCARDIOGRAM TRACING: CPT | Performed by: INTERNAL MEDICINE

## 2017-07-26 PROCEDURE — 25010000002 CEFTRIAXONE: Performed by: INTERNAL MEDICINE

## 2017-07-26 PROCEDURE — 84484 ASSAY OF TROPONIN QUANT: CPT | Performed by: INTERNAL MEDICINE

## 2017-07-26 PROCEDURE — 85025 COMPLETE CBC W/AUTO DIFF WBC: CPT | Performed by: INTERNAL MEDICINE

## 2017-07-26 RX ORDER — CARVEDILOL 6.25 MG/1
12.5 TABLET ORAL EVERY 12 HOURS SCHEDULED
Status: DISCONTINUED | OUTPATIENT
Start: 2017-07-26 | End: 2017-07-27 | Stop reason: HOSPADM

## 2017-07-26 RX ADMIN — LISINOPRIL 40 MG: 10 TABLET ORAL at 08:25

## 2017-07-26 RX ADMIN — ASPIRIN 81 MG: 81 TABLET ORAL at 20:26

## 2017-07-26 RX ADMIN — CLONIDINE HYDROCHLORIDE 0.2 MG: 0.2 TABLET ORAL at 13:06

## 2017-07-26 RX ADMIN — HYDRALAZINE HYDROCHLORIDE 50 MG: 50 TABLET ORAL at 05:47

## 2017-07-26 RX ADMIN — CARVEDILOL 12.5 MG: 6.25 TABLET, FILM COATED ORAL at 08:25

## 2017-07-26 RX ADMIN — HYDRALAZINE HYDROCHLORIDE 50 MG: 50 TABLET ORAL at 20:26

## 2017-07-26 RX ADMIN — ENOXAPARIN SODIUM 30 MG: 30 INJECTION SUBCUTANEOUS at 08:25

## 2017-07-26 RX ADMIN — AMLODIPINE BESYLATE 10 MG: 10 TABLET ORAL at 08:25

## 2017-07-26 RX ADMIN — CLONIDINE HYDROCHLORIDE 0.2 MG: 0.2 TABLET ORAL at 20:26

## 2017-07-26 RX ADMIN — OXYCODONE HYDROCHLORIDE AND ACETAMINOPHEN 500 MG: 500 TABLET ORAL at 08:25

## 2017-07-26 RX ADMIN — CEFTRIAXONE 1 G: 1 INJECTION, POWDER, FOR SOLUTION INTRAMUSCULAR; INTRAVENOUS at 08:00

## 2017-07-26 RX ADMIN — HYDRALAZINE HYDROCHLORIDE 50 MG: 50 TABLET ORAL at 13:06

## 2017-07-26 RX ADMIN — CLONIDINE HYDROCHLORIDE 0.2 MG: 0.2 TABLET ORAL at 05:47

## 2017-07-26 RX ADMIN — ISOSORBIDE MONONITRATE 30 MG: 30 TABLET, EXTENDED RELEASE ORAL at 08:25

## 2017-07-26 RX ADMIN — LEVOTHYROXINE SODIUM 100 MCG: 100 TABLET ORAL at 05:47

## 2017-07-26 RX ADMIN — ATORVASTATIN CALCIUM 20 MG: 20 TABLET, FILM COATED ORAL at 20:26

## 2017-07-26 RX ADMIN — PANTOPRAZOLE SODIUM 40 MG: 40 TABLET, DELAYED RELEASE ORAL at 05:47

## 2017-07-26 RX ADMIN — FAMOTIDINE 20 MG: 20 TABLET ORAL at 08:25

## 2017-07-26 RX ADMIN — CARVEDILOL 12.5 MG: 6.25 TABLET, FILM COATED ORAL at 20:26

## 2017-07-26 RX ADMIN — SPIRONOLACTONE 25 MG: 25 TABLET ORAL at 08:25

## 2017-07-26 RX ADMIN — CLOPIDOGREL BISULFATE 75 MG: 75 TABLET, FILM COATED ORAL at 20:26

## 2017-07-26 NOTE — PROGRESS NOTES
Discharge Planning Assessment  MARC Villalpando     Patient Name: Evi Smith  MRN: [id number]  Today's Date: 7/26/2017    Admit Date: 7/23/2017          Discharge Needs Assessment     None            Discharge Plan       07/26/17 1315    Case Management/Social Work Plan    Plan Pt admitted on 7/23/17.  Pt lives at home with daughter and plans to return home at discharge.  Pt currently does not utilize home health services.  Pt currently utilizes walker and wheelchair.  SS will follow and assist with discharge needs.    Patient/Family In Agreement With Plan yes        Discharge Placement     No information found                Demographic Summary     None            Functional Status     None            Psychosocial     None            Abuse/Neglect     None            Legal     None            Substance Abuse     None            Patient Forms     None          Maggie Mo

## 2017-07-26 NOTE — PLAN OF CARE
Problem: Patient Care Overview (Adult)  Goal: Plan of Care Review  Outcome: Ongoing (interventions implemented as appropriate)  Goal: Adult Individualization and Mutuality  Outcome: Outcome(s) achieved Date Met:  07/26/17    Problem: Pain, Acute (Adult)  Goal: Identify Related Risk Factors and Signs and Symptoms  Outcome: Ongoing (interventions implemented as appropriate)  Goal: Acceptable Pain Control/Comfort Level  Outcome: Ongoing (interventions implemented as appropriate)    Problem: Pressure Ulcer Risk (Tay Scale) (Adult,Obstetrics,Pediatric)  Goal: Identify Related Risk Factors and Signs and Symptoms  Outcome: Ongoing (interventions implemented as appropriate)  Goal: Skin Integrity  Outcome: Ongoing (interventions implemented as appropriate)    Problem: Fall Risk (Adult)  Goal: Identify Related Risk Factors and Signs and Symptoms  Outcome: Ongoing (interventions implemented as appropriate)  Goal: Absence of Falls  Outcome: Ongoing (interventions implemented as appropriate)    Problem: Cardiac Output, Decreased (Adult)  Goal: Identify Related Risk Factors and Signs and Symptoms  Outcome: Ongoing (interventions implemented as appropriate)  Goal: Adequate Cardiac Output/Effective Tissue Perfusion  Outcome: Ongoing (interventions implemented as appropriate)

## 2017-07-26 NOTE — PROGRESS NOTES
"     LOS: 0 days     Name: Evi Smith  Age/Sex: 88 y.o. female  :  3/12/1929        PCP: CAROLINA Stevenson  REF: No ref. provider found    Active Problems:    Elevated troponin      Reason for follow-up:    Subjective  The patient is an 88-year-old white female with a history of stroke in the past, hypertension, thyroid disease, gastric esophageal reflux disease and multiple compression fractures who comes to the hospital for a hypertensive emergency.  According to the patient's daughter she had been dizzy and \"not feeling well\" although she cannot provide any further details on her symptoms.  On checking her blood pressure was noted to be 210/110.  According to the daughter she did report episodes of shortness of breath or last few days.  She also had one episode of chest pain 2 days ago where she states she felt like someone sitting on her chest.  She states the episode lasted approximately 10 minutes.  She states it was associated with shortness of breath.  There are no exacerbating or ameliorating factors.  He states the intensity was 5/10 on the pain scale.  At that point it was decided to bring her to the emergency room.  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  Troponins were checked and were noted to be minimally elevated at 0.047 at peak.  However her blood pressure was noted to be significantly elevated in the 180s systolic.  She was admitted to the medicine team and cardiology was consulted for further evaluation.      Interval History:Patient denies any further chest pain. Breathing has been fair.    ROS    Vital Signs  Temp:  [97.2 °F (36.2 °C)-98.6 °F (37 °C)] 97.2 °F (36.2 °C)  Heart Rate:  [67-90] 78  Resp:  [16-18] 16  BP: (117-195)/(60-87) 157/69  Vital Signs (last 72 hrs)       700  -   0659 700  -   0659  07  -   0659 700  -   2208   Most Recent    Temp (°F)  "  97.4 -  98.3    98 -  98.6    97.2 -  97.7     97.2 (36.2)    Heart Rate   83 -  100    69 -  92    67 -  90     78    Resp     18      18      16     16    BP   153/68 -  (!) 196/97    130/56 -  (!) 195/76    117/60 -  (!) 194/87     157/69    SpO2 (%)   96 -  98    96 -  99    98 -  99     99        Body mass index is 21.8 kg/(m^2).    Intake/Output Summary (Last 24 hours) at 07/25/17 2208  Last data filed at 07/25/17 2102   Gross per 24 hour   Intake             2020 ml   Output             1350 ml   Net              670 ml     Objective        Physical Exam:     General Appearance:    Alert, cooperative, in no acute distress   Head:    Normocephalic, without obvious abnormality, atraumatic   Eyes:            Conjunctivae and sclerae normal, no   icterus, no pallor, corneas clear.   Neck:   No adenopathy, supple, trachea midline, no thyromegaly, no   carotid bruit, no JVD   Lungs:     Mild wheezing.respirations regular, even and                  unlabored    Heart:    Regular rhythm and normal rate, normal S1 and S2, no            murmur, no gallop, no rub, no click   Chest Wall:    No abnormalities observed   Abdomen:     Normal bowel sounds, no masses, no organomegaly, soft        non-tender, non-distended, no guarding, no rebound                tenderness   Extremities:   Moves all extremities well, no edema, no cyanosis, no             redness   Pulses:   Pulses palpable and equal bilaterally   Skin:   No bleeding, bruising or rash              Procedures    Results Review:     Results from last 7 days  Lab Units 07/25/17  0109 07/24/17  0340 07/23/17  2138   WBC 10*3/mm3 15.04* 16.18* 14.36*   HEMOGLOBIN g/dL 12.5 12.5 13.2   PLATELETS 10*3/mm3 192 204 213       Results from last 7 days  Lab Units 07/25/17  0109 07/23/17  2138   SODIUM mmol/L 141 142   POTASSIUM mmol/L 3.8 3.7   CHLORIDE mmol/L 109 107   CO2 mmol/L 24.3 30.9   BUN mg/dL 15 20   CREATININE mg/dL 1.03 1.14   CALCIUM mg/dL 9.2 10.0   GLUCOSE  mg/dL 106 118*   ALT (SGPT) U/L  --  8*   AST (SGOT) U/L  --  16       Results from last 7 days  Lab Units 07/25/17  0706 07/25/17  0109 07/24/17  1820 07/24/17  0932 07/24/17  0340 07/23/17  2351 07/23/17  2138   CK TOTAL U/L 48 67 50 61 58  --  44   TROPONIN I ng/mL  --   --   --  0.047* 0.038 0.039 0.052*   CKMB ng/mL 0.96 0.94 1.42 2.39 2.41  --  2.51   MYOGLOBIN ng/mL 41.0 52.0 74.0 84.0 115.0*  --  53.0     No results found for: INR  No results found for: MG  Lab Results   Component Value Date    TRIG 134 07/25/2017    HDL 37 (L) 07/25/2017      Lab Results   Component Value Date    .0 (H) 07/23/2017       Echo   Lab Results   Component Value Date    ECHOEFEST 65 07/24/2017   · The study is technically difficult for diagnosis.  · Left ventricular systolic function is normal. Estimated EF = 65%.  · Left ventricular diastolic dysfunction (grade I) consistent with impaired relaxation.  · There are no significant valvular abnormalities noted.  There is no evidence of pericardial effusion.    Nuclear Stress Test  · Findings consistent with a normal ECG stress test.  · Myocardial perfusion imaging indicates a small-sized, mild degree of ischemia located in the lateral wall.  · Impressions are consistent with a low risk study.  · There is no prior study available for comparison.    Medication Review:     amLODIPine 10 mg Oral Daily   aspirin 81 mg Oral Nightly   atorvastatin 20 mg Oral Nightly   carvedilol 6.25 mg Oral Q12H   ceftriaxone 1 g Intravenous Q24H   CloNIDine 0.2 mg Oral Q8H   clopidogrel 75 mg Oral Nightly   enoxaparin 30 mg Subcutaneous Daily   famotidine 20 mg Oral Daily   hydrALAZINE 50 mg Oral Q8H   levothyroxine 100 mcg Oral Q AM   lisinopril 40 mg Oral Q24H   pantoprazole 40 mg Oral QAM   Pharmacy Meds to Bed Consult  Does not apply Daily   spironolactone 25 mg Oral Daily   vitamin C 500 mg Oral Daily         sodium chloride 75 mL/hr Last Rate: 75 mL/hr (07/25/17 2102)        Assessment:    1. Mildly elevated troponin(0.052 max) trending down.  2. Mildly abnormal lexiscan sestamibi.  3. Labile hypertension.    Recommendations:    1. Since the area and degree of ischemia is small, will treat with meds for now and see.   2. Consider cardiac cath if reccurent anginal sx.  3. Add oral nitrates.      I discussed the patients findings and my recommendations with patient and family        Alverto Mcdermott MDDARRIAN  07/25/17  10:08 PM    Dragon disclaimer:  Much of this encounter note is an electronic transcription/translation of spoken language to printed text. The electronic translation of spoken language may permit erroneous, or at times, nonsensical words or phrases to be inadvertently transcribed; Although I have reviewed the note for such errors, some may still exist.

## 2017-07-26 NOTE — PROGRESS NOTES
"   LOS: 1 day     Name: Evi Smith  Age/Sex: 88 y.o. female  :  3/12/1929        PCP: CAROLINA Stevenson    Active Problems:    Elevated troponin      Admission Information: Evi Smith is a 88 y.o. female with a past medical history significant for hypertension, thyroid disease, gastric esophageal reflux disease and multiple compression fractures.  She came to the hospital with hypertensive emergency. According to the patient's daughter she had been dizzy and \"not feeling well\" although she cannot provide any further details on her symptoms.  On checking her blood pressure was noted to be 210/110.  According to the daughter she did report episodes of shortness of breath or last few days.  She also had one episode of chest pain 2 days ago where she states she felt like someone sitting on her chest.  She states the episode lasted approximately 10 minutes.  She states it was associated with shortness of breath.  There are no exacerbating or ameliorating factors.  He states the intensity was 5/10 on the pain scale.  At that point it was decided to bring her to the emergency room.  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  Troponins were checked and were noted to be minimally elevated at 0.047 at peak.  However her blood pressure was noted to be significantly elevated in the 180s systolic.  She was admitted to the medicine team and cardiology was consulted for further evaluation.    Following for: Chest pain    Telemetry Monitoring: Sinus rhythm and sinus cadence 50's and 60's    Interval history: Ms Smith is resting comfortably in her bed this morning.  She denies chest pain, palpitations and shortness of breath.      Subjective     Review of Systems   Constitution: Negative for weakness.   Cardiovascular: Negative for chest pain, irregular heartbeat and palpitations.   Respiratory: Negative for shortness of breath.        Vital " Signs  Vital Signs (last 72 hrs)       07/23 0700  -  07/24 0659 07/24 0700  -  07/25 0659 07/25 0700  -  07/26 0659 07/26 0700  -  07/26 0837   Most Recent    Temp (°F) 97.4 -  98.3    98 -  98.6    97.2 -  97.9       97.9 (36.6)    Heart Rate 83 -  100    69 -  92    65 -  90       65    Resp   18      18    16 -  18       18    /68 -  (!) 196/97    130/56 -  (!) 195/76    117/60 -  (!) 198/82       129/51    SpO2 (%) 96 -  98    96 -  99    98 -  99       98        Temp:  [97.2 °F (36.2 °C)-97.9 °F (36.6 °C)] 97.9 °F (36.6 °C)  Heart Rate:  [65-90] 65  Resp:  [16-18] 18  BP: (117-198)/(51-93) 129/51  Body mass index is 21.89 kg/(m^2).      Intake/Output Summary (Last 24 hours) at 07/26/17 0837  Last data filed at 07/26/17 0335   Gross per 24 hour   Intake             1240 ml   Output             1600 ml   Net             -360 ml       Physical Exam   Constitutional: She appears well-developed and well-nourished.   HENT:   Head: Normocephalic and atraumatic.   Neck: No JVD present.   Cardiovascular: Normal rate, regular rhythm, S1 normal and S2 normal.    Pulses:       Dorsalis pedis pulses are 1+ on the right side, and 1+ on the left side.        Posterior tibial pulses are 1+ on the right side, and 1+ on the left side.   No LE edema noted   Pulmonary/Chest: Effort normal and breath sounds normal. No respiratory distress. She has no wheezes. She has no rales. She exhibits no tenderness.   Abdominal: Soft. Bowel sounds are normal.   Skin: Skin is warm and dry.   Psychiatric: She has a normal mood and affect.              Results Review:     7/25/2017 Transthoracic Echocardiogram   The study is technically difficult for diagnosis.  Left ventricular systolic function is normal. Estimated EF = 65%.  Left ventricular diastolic dysfunction (grade I) consistent with impaired relaxation.  There are no significant valvular abnormalities noted.  There is no evidence of pericardial effusion.    7/25/2017  Stress Test    Findings consistent with a normal ECG stress test.  Myocardial perfusion imaging indicates a small-sized, mild degree of ischemia located in the lateral wall.  Impressions are consistent with a low risk study.  There is no prior study available for comparison    Results from last 7 days  Lab Units 07/26/17  0051 07/25/17  0109 07/24/17  0340 07/23/17  2138   WBC 10*3/mm3 11.60 15.04* 16.18* 14.36*   HEMOGLOBIN g/dL 12.6 12.5 12.5 13.2   PLATELETS 10*3/mm3 187 192 204 213       Results from last 7 days  Lab Units 07/26/17  0051 07/25/17  0109 07/23/17  2138   SODIUM mmol/L 142 141 142   POTASSIUM mmol/L 3.4* 3.8 3.7   CHLORIDE mmol/L 110 109 107   CO2 mmol/L 30.8 24.3 30.9   BUN mg/dL 11 15 20   CREATININE mg/dL 0.85 1.03 1.14   CALCIUM mg/dL 9.3 9.2 10.0   GLUCOSE mg/dL 144* 106 118*       Results from last 7 days  Lab Units 07/26/17  0051 07/25/17  0706 07/25/17  0109 07/24/17  1820 07/24/17  0932 07/24/17  0340 07/23/17  2351 07/23/17  2138   CK TOTAL U/L 45 48 67 50 61 58  --  44   TROPONIN I ng/mL  --   --   --   --  0.047* 0.038 0.039 0.052*   CKMB ng/mL 1.28 0.96 0.94 1.42 2.39 2.41  --  2.51   MYOGLOBIN ng/mL 47.0 41.0 52.0 74.0 84.0 115.0*  --  53.0             I reviewed the patient's new clinical results.  I reviewed the patient's new imaging results and agree with the interpretation.  I personally viewed and interpreted the patient's EKG/Telemetry data      Medication Review:     amLODIPine 10 mg Oral Daily   aspirin 81 mg Oral Nightly   atorvastatin 20 mg Oral Nightly   carvedilol 12.5 mg Oral Q12H   ceftriaxone 1 g Intravenous Q24H   CloNIDine 0.2 mg Oral Q8H   clopidogrel 75 mg Oral Nightly   enoxaparin 30 mg Subcutaneous Daily   famotidine 20 mg Oral Daily   hydrALAZINE 50 mg Oral Q8H   isosorbide mononitrate 30 mg Oral Q24H   levothyroxine 100 mcg Oral Q AM   lisinopril 40 mg Oral Q24H   pantoprazole 40 mg Oral QAM   Pharmacy Meds to Bed Consult  Does not apply Daily   spironolactone 25 mg Oral  Daily   vitamin C 500 mg Oral Daily         Medication Review:    Current Facility-Administered Medications:   •  amLODIPine (NORVASC) tablet 10 mg, 10 mg, Oral, Daily, Florence Zheng MD, 10 mg at 07/26/17 0825  •  aspirin EC tablet 81 mg, 81 mg, Oral, Nightly, Florence Zheng MD, 81 mg at 07/25/17 2102  •  atorvastatin (LIPITOR) tablet 20 mg, 20 mg, Oral, Nightly, Florence Zheng MD, 20 mg at 07/25/17 2102  •  carvedilol (COREG) tablet 12.5 mg, 12.5 mg, Oral, Q12H, Florence Zheng MD, 12.5 mg at 07/26/17 0825  •  cefTRIAXone (ROCEPHIN) 1 g/100 mL 0.9% NS (MBP), 1 g, Intravenous, Q24H, Florence Zheng MD, 1 g at 07/25/17 0818  •  cetirizine (zyrTEC) tablet 5 mg, 5 mg, Oral, Daily PRN, Florence Zheng MD  •  CloNIDine (CATAPRES) tablet 0.2 mg, 0.2 mg, Oral, Q8H, Florence Zheng MD, 0.2 mg at 07/26/17 0547  •  clopidogrel (PLAVIX) tablet 75 mg, 75 mg, Oral, Nightly, Florence Zheng MD, 75 mg at 07/25/17 2102  •  enoxaparin (LOVENOX) syringe 30 mg, 30 mg, Subcutaneous, Daily, Florence Zheng MD, 30 mg at 07/26/17 0825  •  famotidine (PEPCID) tablet 20 mg, 20 mg, Oral, Daily, Florence Zheng MD, 20 mg at 07/26/17 0825  •  hydrALAZINE (APRESOLINE) tablet 50 mg, 50 mg, Oral, Q8H, Sheldon Caldwell MD, 50 mg at 07/26/17 0547  •  isosorbide mononitrate (IMDUR) 24 hr tablet 30 mg, 30 mg, Oral, Q24H, Alverto Mcdermott MD, 30 mg at 07/26/17 0825  •  labetalol (NORMODYNE,TRANDATE) injection 20 mg, 20 mg, Intravenous, Q6H PRN, Samuel Duane Kreis, MD, 20 mg at 07/25/17 1952  •  levothyroxine (SYNTHROID, LEVOTHROID) tablet 100 mcg, 100 mcg, Oral, Q AM, Florence Zheng MD, 100 mcg at 07/26/17 0547  •  lisinopril (PRINIVIL,ZESTRIL) tablet 40 mg, 40 mg, Oral, Q24H, Florence Zheng MD, 40 mg at 07/26/17 0825  •  nitroglycerin (NITROSTAT) SL tablet 0.4 mg, 0.4 mg, Sublingual, Q5 Min PRN, Sree Tamayo MD  •  pantoprazole (PROTONIX) EC tablet 40 mg, 40 mg, Oral, QAM, Florence Zheng MD, 40 mg at 07/26/17 0547  •   Pharmacy Meds to Bed Consult, , Does not apply, Daily, Kitty Nelson, [facility]  •  polyethylene glycol (MIRALAX) powder 17 g, 17 g, Oral, Daily PRN, Florence Zheng MD  •  Insert peripheral IV, , , Once **AND** sodium chloride 0.9 % flush 10 mL, 10 mL, Intravenous, PRN, EZIO Batista  •  spironolactone (ALDACTONE) tablet 25 mg, 25 mg, Oral, Daily, Florence Zheng MD, 25 mg at 07/26/17 0825  •  vitamin C (ASCORBIC ACID) tablet 500 mg, 500 mg, Oral, Daily, Florence Zheng MD, 500 mg at 07/26/17 0825         Assessment:  1. Mildly elevated troponin(0.052 max) trending down.Patient asymptomatic.  2. Mildly abnormal lexiscan sestamibi with small lateral wall ischemia..  3. Labile hypertension,BP meds being adjusted by Dr Zheng..      Recommendations:  1.  Since the area and degree of ischemia is small, will treat with meds for now and see.   2.  Consider cardiac cath if reccurent anginal sx.  3.  Continue with aspirin,plavix,coreg,atorvastatin and oral nitrates      I discussed the patients findings and my recommendations with patient and family      CAROLINA Salazar, acting as scribe for   07/26/17  8:37 AM    Addendum:    I have  Seen and evaluated  and did H&P and formulated the impression and recommendation plan and discussed with Lynette Garcia who has scribed this note for me.     Alverto Mendiola MD,[facility]  07/26/17  8:37 AM

## 2017-07-26 NOTE — PROGRESS NOTES
LOS: 1 day   Patient Care Team:  CAROLINA Stevenson as PCP - General (Family Medicine)    Brief history of present illness:   Patient is a 88 y.o. female presents with chest pain and shortness of breath last 2-3 days.  Symptoms started abruptly and has been present intermittently pain is retrosternal and dull increases with activity.  Shortness of breath has been mild without any cough or expectoration.  Patient does have multiple risk factor for coronary artery disease.  Her workup in the emergency room showed borderline troponin she was admitted to the hospital for further care.  Patient also has leukocytosis.  She has some nausea and abdominal tenderness and CT study showed gallstone, patient was empirically started on IV antibiotic.  We will consult cardiology first  to rule out ischemic heart disease, if the workup is negative then she may need evaluation by gastroenterologist or surgeon for gallstone.         Subjective     Patient is feeling fair.  Denying any chest pain palpitation or shortness of breath.  History taken from: patient and chart    Interval History:     No significant changes to patients past history, family history, and social history.     Review of Systems:   Review of Systems - General ROS: negative for - fever, weight gain or weight loss.  Positive for weakness  Psychological ROS: negative for - anxiety, disorientation or hallucinations  Ophthalmic ROS: negative for - blurry vision or double vision  ENT ROS: negative for - hearing change or vertigo  Allergy and Immunology ROS: negative for - hives, insect bite sensitivity or immunodeficiency  Hematological and Lymphatic ROS: negative for - bruising or swollen lymph nodes  Endocrine ROS: negative for - galactorrhea or hair pattern changes, no polyuria or polydipsia.  Respiratory ROS: negative for - hemoptysis or sputum changes, cough or wheezing.  Positive for shortness of breath  Cardiovascular ROS: negative for - loss of  consciousnes or palpitation.  Positive for chest  Gastrointestinal ROS: negative for - melena or stool incontinence, nausea vomiting , diarrhea or abdominal pain  Genito-Urinary ROS: negative for - genital ulcers or pelvic pain or dysuria  Musculoskeletal ROS: negative for - gait disturbance or muscular weakness  Neurological ROS: negative for - bowel and bladder control changes or seizures, no focal weakness  Dermatological ROS: negative for hair changes and nail changes            Physical Exam:    Vital Signs  Temp:  [97.2 °F (36.2 °C)-97.9 °F (36.6 °C)] 97.9 °F (36.6 °C)  Heart Rate:  [65-90] 65  Resp:  [16-18] 18  BP: (117-198)/(51-93) 129/51   General Appearance:     Alert, cooperative, in no acute distress   Head:  Normocephalic, without obvious abnormality, atraumatic   Eyes:          Lids and lashes normal, conjunctivae and sclerae normal, no icterus, no pallor, corneas clear, PERRLA   Ears:  Ears appear intact with no abnormalities noted   Throat: No oral lesions, no thrush, oral mucosa moist   Neck: No adenopathy, supple, trachea midline, no thyromegaly, no carotid bruit, no JVD   Back:   no skin lesions, no erythema or scars, no tenderness to percussion or palpation.     Lungs:   Clear to auscultation,respirations regular, even and               Unlabored, no wheezing     Heart:  Regular rhythm and normal rate, normal S1 and S2, no        murmur, no gallop, no rub, no click   Abdomen:   Normal bowel sounds, no masses, no organomegaly, soft   non-tender, non-distended, no guarding, no rebound   tenderness   Extremities: Moves all extremities well, no edema, no cyanosis, no         redness   Pulses: Pulses palpable and equal bilaterally   Skin: No bleeding, bruising or rash   Lymph nodes: No palpable adenopathy   Neurologic: Cranial nerves 2 - 12 grossly intact, sensation intact, overall normal motor strength, DTR present and equal bilaterally        Labs:    Results from last 7 days  Lab Units  07/26/17  0051 07/25/17  0109 07/24/17  0340   WBC 10*3/mm3 11.60 15.04* 16.18*   HEMOGLOBIN g/dL 12.6 12.5 12.5   HEMATOCRIT % 39.0 38.6 38.7   MCV fL 93.5 94.6* 93.3   MCHC g/dL 32.3* 32.4* 32.3*   PLATELETS 10*3/mm3 187 192 204       Results from last 7 days  Lab Units 07/23/17  2058   PH, ARTERIAL pH units 7.467*   PO2 ART mm Hg 71.2*   PCO2, ARTERIAL mm Hg 34.9*   HCO3 ART mmol/L 24.7       Results from last 7 days  Lab Units 07/26/17 0051 07/25/17 0109 07/23/17  2138   SODIUM mmol/L 142 141 142   POTASSIUM mmol/L 3.4* 3.8 3.7   CHLORIDE mmol/L 110 109 107   CO2 mmol/L 30.8 24.3 30.9   BUN mg/dL 11 15 20   CREATININE mg/dL 0.85 1.03 1.14   EGFR IF NONAFRICN AM mL/min/1.73 63 51* 45*   CALCIUM mg/dL 9.3 9.2 10.0   GLUCOSE mg/dL 144* 106 118*   ALBUMIN g/dL  --   --  4.50   BILIRUBIN mg/dL  --   --  0.5   ALK PHOS U/L  --   --  90   AST (SGOT) U/L  --   --  16   ALT (SGPT) U/L  --   --  8*   Estimated Creatinine Clearance: 36.7 mL/min (by C-G formula based on Cr of 0.85).  No results found for: AMMONIA  Lab Results   Component Value Date    CKTOTAL 45 07/26/2017    CKMB 1.28 07/26/2017    CKMBINDEX 2.8 07/26/2017    TROPONINI 0.047 (H) 07/24/2017         No results found for: HGBA1C  No results found for: TSH, FREET4  No results found for: PREGTESTUR, PREGSERUM, HCG, HCGQUANT    Pain Management Panel     Pain Management Panel Latest Ref Rng & Units 7/23/2017    AMPHETAMINES SCREEN, URINE Negative Negative    BARBITURATES SCREEN Negative Negative    BENZODIAZEPINE SCREEN, URINE Negative Negative    BUPRENORPHINE Negative Negative    COCAINE SCREEN, URINE Negative Negative    METHADONE SCREEN, URINE Negative Negative            Results from last 7 days  Lab Units 07/25/17  0109   CHOLESTEROL mg/dL 111   TRIGLYCERIDES mg/dL 134   HDL CHOL mg/dL 37*       Cultures:  @LASTLAB3(BLOODCX:*,URINECX:*,WOUNDCX:*,MRSACX:*,RESPCX:*,STOOLCX:*)@    Lab Results   Component Value Date    CKTOTAL 45 07/26/2017    CKTOTAL 48  07/25/2017    CKTOTAL 67 07/25/2017    CKMB 1.28 07/26/2017    CKMB 0.96 07/25/2017    CKMB 0.94 07/25/2017    CKMBINDEX 2.8 07/26/2017    CKMBINDEX 2.0 07/25/2017    CKMBINDEX 1.4 07/25/2017    TROPONINI 0.047 (H) 07/24/2017    TROPONINI 0.038 07/24/2017    TROPONINI 0.039 07/23/2017             Images:  Imaging Results (last 24 hours)     ** No results found for the last 24 hours. **           Active Meds      Current Facility-Administered Medications:   •  amLODIPine (NORVASC) tablet 10 mg, 10 mg, Oral, Daily, Florence Zheng MD, 10 mg at 07/25/17 1601  •  aspirin EC tablet 81 mg, 81 mg, Oral, Nightly, Florence Zheng MD, 81 mg at 07/25/17 2102  •  atorvastatin (LIPITOR) tablet 20 mg, 20 mg, Oral, Nightly, Florence hZeng MD, 20 mg at 07/25/17 2102  •  carvedilol (COREG) tablet 12.5 mg, 12.5 mg, Oral, Q12H, Florence Zheng MD  •  cefTRIAXone (ROCEPHIN) 1 g/100 mL 0.9% NS (MBP), 1 g, Intravenous, Q24H, Florence Zheng MD, 1 g at 07/25/17 0818  •  cetirizine (zyrTEC) tablet 5 mg, 5 mg, Oral, Daily PRN, Florence Zheng MD  •  CloNIDine (CATAPRES) tablet 0.2 mg, 0.2 mg, Oral, Q8H, Florence Zheng MD, 0.2 mg at 07/26/17 0547  •  clopidogrel (PLAVIX) tablet 75 mg, 75 mg, Oral, Nightly, Florence Zheng MD, 75 mg at 07/25/17 2102  •  enoxaparin (LOVENOX) syringe 30 mg, 30 mg, Subcutaneous, Daily, Florence Zheng MD, 30 mg at 07/25/17 1601  •  famotidine (PEPCID) tablet 20 mg, 20 mg, Oral, Daily, Florence Zheng MD, 20 mg at 07/25/17 1554  •  hydrALAZINE (APRESOLINE) tablet 50 mg, 50 mg, Oral, Q8H, Sheldon Caldwell MD, 50 mg at 07/26/17 0547  •  isosorbide mononitrate (IMDUR) 24 hr tablet 30 mg, 30 mg, Oral, Q24H, Alverto Mcdermott MD  •  labetalol (NORMODYNE,TRANDATE) injection 20 mg, 20 mg, Intravenous, Q6H PRN, Samuel Duane Kreis, MD, 20 mg at 07/25/17 1952  •  levothyroxine (SYNTHROID, LEVOTHROID) tablet 100 mcg, 100 mcg, Oral, Q AM, Florence Zheng MD, 100 mcg at 07/26/17 0547  •  lisinopril  (PRINIVIL,ZESTRIL) tablet 40 mg, 40 mg, Oral, Q24H, Florence Zheng MD, 40 mg at 07/25/17 6102  •  nitroglycerin (NITROSTAT) SL tablet 0.4 mg, 0.4 mg, Sublingual, Q5 Min PRN, Sree Tamayo MD  •  pantoprazole (PROTONIX) EC tablet 40 mg, 40 mg, Oral, QAM, Florence Zheng MD, 40 mg at 07/26/17 5143  •  Pharmacy Meds to Bed Consult, , Does not apply, Daily, Kitty Nelson [facility]  •  polyethylene glycol (MIRALAX) powder 17 g, 17 g, Oral, Daily PRN, Florence Zheng MD  •  Insert peripheral IV, , , Once **AND** sodium chloride 0.9 % flush 10 mL, 10 mL, Intravenous, PRN, EZIO Batista  •  spironolactone (ALDACTONE) tablet 25 mg, 25 mg, Oral, Daily, Folrence Zheng MD, 25 mg at 07/25/17 8032  •  vitamin C (ASCORBIC ACID) tablet 500 mg, 500 mg, Oral, Daily, Florence Zheng MD, 500 mg at 07/25/17 2817    Assessment/Plan     Active Problems:    Unstable angina     Gallbladder disease     Sepsis      Accelerated hypertension        Patient is feeling better.  Catherine scan stress test showed a small L. wall ischemia, medical management is recommended by cardiologist.  Patient ambulated with assistance.  Blood pressure is is still high, we will increase carvedilol dosage.  Continue other treatment.  Possible discharge in the morning.        Florence Zheng MD  07/26/17  6:37 AM

## 2017-07-26 NOTE — PLAN OF CARE
Problem: Patient Care Overview (Adult)  Goal: Plan of Care Review  Outcome: Ongoing (interventions implemented as appropriate)    Problem: Fall Risk (Adult)  Goal: Identify Related Risk Factors and Signs and Symptoms  Outcome: Ongoing (interventions implemented as appropriate)

## 2017-07-27 VITALS
TEMPERATURE: 97.6 F | WEIGHT: 127.56 LBS | SYSTOLIC BLOOD PRESSURE: 134 MMHG | OXYGEN SATURATION: 94 % | HEIGHT: 60 IN | DIASTOLIC BLOOD PRESSURE: 64 MMHG | HEART RATE: 69 BPM | BODY MASS INDEX: 25.04 KG/M2 | RESPIRATION RATE: 20 BRPM

## 2017-07-27 LAB
CK MB SERPL-CCNC: 0.92 NG/ML (ref 0–5)
CK MB SERPL-CCNC: 0.95 NG/ML (ref 0–5)
CK MB SERPL-RTO: 2.1 % (ref 0–3)
CK MB SERPL-RTO: 2.9 % (ref 0–3)
CK SERPL-CCNC: 32 U/L (ref 24–173)
CK SERPL-CCNC: 46 U/L (ref 24–173)
MYOGLOBIN SERPL-MCNC: 39 NG/ML (ref 0–109)
MYOGLOBIN SERPL-MCNC: 39 NG/ML (ref 0–109)

## 2017-07-27 PROCEDURE — 25010000002 ENOXAPARIN PER 10 MG: Performed by: INTERNAL MEDICINE

## 2017-07-27 PROCEDURE — 82553 CREATINE MB FRACTION: CPT | Performed by: FAMILY MEDICINE

## 2017-07-27 PROCEDURE — 83874 ASSAY OF MYOGLOBIN: CPT | Performed by: FAMILY MEDICINE

## 2017-07-27 PROCEDURE — 82550 ASSAY OF CK (CPK): CPT | Performed by: FAMILY MEDICINE

## 2017-07-27 PROCEDURE — 25010000002 CEFTRIAXONE: Performed by: INTERNAL MEDICINE

## 2017-07-27 RX ORDER — SPIRONOLACTONE 25 MG/1
25 TABLET ORAL DAILY
Qty: 30 TABLET | Refills: 0 | Status: SHIPPED | OUTPATIENT
Start: 2017-07-27 | End: 2020-07-15

## 2017-07-27 RX ORDER — ISOSORBIDE MONONITRATE 30 MG/1
30 TABLET, EXTENDED RELEASE ORAL
Qty: 30 TABLET | Refills: 0 | Status: SHIPPED | OUTPATIENT
Start: 2017-07-27

## 2017-07-27 RX ORDER — CARVEDILOL 12.5 MG/1
12.5 TABLET ORAL EVERY 12 HOURS SCHEDULED
Qty: 60 TABLET | Refills: 0 | Status: SHIPPED | OUTPATIENT
Start: 2017-07-27

## 2017-07-27 RX ORDER — AMLODIPINE BESYLATE 5 MG/1
5 TABLET ORAL DAILY
Status: DISCONTINUED | OUTPATIENT
Start: 2017-07-27 | End: 2017-07-27 | Stop reason: HOSPADM

## 2017-07-27 RX ORDER — ATORVASTATIN CALCIUM 20 MG/1
20 TABLET, FILM COATED ORAL NIGHTLY
Qty: 30 TABLET | Refills: 0 | Status: SHIPPED | OUTPATIENT
Start: 2017-07-27

## 2017-07-27 RX ADMIN — ISOSORBIDE MONONITRATE 30 MG: 30 TABLET, EXTENDED RELEASE ORAL at 08:26

## 2017-07-27 RX ADMIN — AMLODIPINE BESYLATE 5 MG: 5 TABLET ORAL at 08:26

## 2017-07-27 RX ADMIN — FAMOTIDINE 20 MG: 20 TABLET ORAL at 08:27

## 2017-07-27 RX ADMIN — LEVOTHYROXINE SODIUM 100 MCG: 100 TABLET ORAL at 05:12

## 2017-07-27 RX ADMIN — CEFTRIAXONE 1 G: 1 INJECTION, POWDER, FOR SOLUTION INTRAMUSCULAR; INTRAVENOUS at 08:29

## 2017-07-27 RX ADMIN — OXYCODONE HYDROCHLORIDE AND ACETAMINOPHEN 500 MG: 500 TABLET ORAL at 08:27

## 2017-07-27 RX ADMIN — LISINOPRIL 40 MG: 10 TABLET ORAL at 08:27

## 2017-07-27 RX ADMIN — ENOXAPARIN SODIUM 30 MG: 30 INJECTION SUBCUTANEOUS at 08:28

## 2017-07-27 RX ADMIN — HYDRALAZINE HYDROCHLORIDE 50 MG: 50 TABLET ORAL at 05:12

## 2017-07-27 RX ADMIN — CARVEDILOL 12.5 MG: 6.25 TABLET, FILM COATED ORAL at 08:26

## 2017-07-27 RX ADMIN — CLONIDINE HYDROCHLORIDE 0.2 MG: 0.2 TABLET ORAL at 05:12

## 2017-07-27 RX ADMIN — SPIRONOLACTONE 25 MG: 25 TABLET ORAL at 08:27

## 2017-07-27 RX ADMIN — PANTOPRAZOLE SODIUM 40 MG: 40 TABLET, DELAYED RELEASE ORAL at 05:13

## 2017-07-27 NOTE — PROGRESS NOTES
Discharge Planning Assessment  MARC Villalpando     Patient Name: Evi Smith  MRN: [id number]  Today's Date: 7/27/2017    Admit Date: 7/23/2017          Discharge Needs Assessment     None            Discharge Plan       07/27/17 0845    Final Note    Final Note Pt to be discharged home on this date.  No further intervention needed.        Discharge Placement     No information found        Expected Discharge Date and Time     Expected Discharge Date Expected Discharge Time    Jul 27, 2017               Demographic Summary     None            Functional Status     None            Psychosocial     None            Abuse/Neglect     None            Legal     None            Substance Abuse     None            Patient Forms     None          Maggie Mo

## 2017-07-27 NOTE — PLAN OF CARE
Problem: Pain, Acute (Adult)  Goal: Acceptable Pain Control/Comfort Level  Outcome: Ongoing (interventions implemented as appropriate)    07/25/17 0854   Pain, Acute (Adult)   Acceptable Pain Control/Comfort Level making progress toward outcome         Problem: Pressure Ulcer Risk (Tay Scale) (Adult,Obstetrics,Pediatric)  Goal: Identify Related Risk Factors and Signs and Symptoms  Outcome: Ongoing (interventions implemented as appropriate)    07/25/17 0854   Pressure Ulcer Risk (Tay Scale)   Related Risk Factors (Pressure Ulcer Risk (Tay Scale)) age extremes       Goal: Skin Integrity  Outcome: Ongoing (interventions implemented as appropriate)    07/25/17 0854   Pressure Ulcer Risk (Tay Scale) (Adult,Obstetrics,Pediatric)   Skin Integrity making progress toward outcome         Problem: Fall Risk (Adult)  Goal: Identify Related Risk Factors and Signs and Symptoms  Outcome: Ongoing (interventions implemented as appropriate)    07/25/17 0854   Fall Risk   Fall Risk: Related Risk Factors history of falls;environment unfamiliar   Fall Risk: Signs and Symptoms presence of risk factors       Goal: Absence of Falls  Outcome: Ongoing (interventions implemented as appropriate)    07/25/17 0854   Fall Risk (Adult)   Absence of Falls making progress toward outcome         Problem: Cardiac Output, Decreased (Adult)  Goal: Identify Related Risk Factors and Signs and Symptoms  Outcome: Ongoing (interventions implemented as appropriate)    07/25/17 0854   Cardiac Output, Decreased   Cardiac Output, Decreased: Related Risk Factors disease process   Signs and Symptoms (Cardiac Output Decreased) angina;dyspnea       Goal: Adequate Cardiac Output/Effective Tissue Perfusion  Outcome: Ongoing (interventions implemented as appropriate)    07/25/17 0854   Cardiac Output, Decreased (Adult)   Adequate Cardiac Output/Effective Tissue Perfusion making progress toward outcome

## 2017-07-27 NOTE — DISCHARGE SUMMARY
Date of Admission: 7/23/2017  8:41 PM    Date of Discharge:  7/27/2017      Presenting Problem/History of Present Illness    Patient is a 88 y.o. female presents with chest pain and shortness of breath last 2-3 days.  Symptoms started abruptly and has been present intermittently pain is retrosternal and dull increases with activity.  Shortness of breath has been mild without any cough or expectoration.  Patient does have multiple risk factor for coronary artery disease.  Her workup in the emergency room showed borderline troponin she was admitted to the hospital for further care.  Patient also has leukocytosis.  She has some nausea and abdominal tenderness and CT study showed gallstone, patient was empirically started on IV antibiotic. Cardiology consult was requested.        Hospital Course  Patient is a 88 y.o. female presented with signs and symptoms of angina and sepsis.  Patient was treated on the telemetry floor.  Serial cardiac markers were done there were negative for acute myocardial injury, patient was evaluated by cardiologist, her echo showed normal LV ejection fraction and diastolic dysfunction.  Her Catherine scan a stress test was positive for small lateral wall ischemia for which cardiologist recommended medical management.  From a cardiac standpoint patient has done well she did not have anymore chest pain or shortness of breath.  She is ambulating with minimal assistance.  Patient has difficult to control blood pressure several medication changes were made to optimize blood pressure control, the last 24 hours patient has done reasonably well and her blood pressure is acceptable.  Patient's daughter was advised to closely monitor blood pressure at home.  There are significant highs or lows then let me know.  Patient's sepsis finding resolved, she has been afebrile and leukocytosis has resolved.  I don't know the exact etiology although her urine test on admission has positive leukocyte esterase and she  might have urinary tract infection, she has received IV ceftriaxone.  Clinically patient is stable and is very eager to go home.  I have instructed patient's daughter about changes in the medication.  And we will follow her closely in our office within 1 week time.    Procedures Performed         Consults:   Consults     Date and Time Order Name Status Description    7/23/2017 2314 Cardiology (on-call MD unless specified)            Pertinent Test Results:     Lab Results (last 24 hours)     Procedure Component Value Units Date/Time    CK [[id number]]  (Normal) Collected:  07/26/17 1433    Specimen:  Blood Updated:  07/26/17 1506     Creatine Kinase 41 U/L     Troponin [[id number]]  (Normal) Collected:  07/26/17 1433    Specimen:  Blood Updated:  07/26/17 1515     Troponin I 0.028 ng/mL     Narrative:       Ultra Troponin I Reference Range:         <=0.039 ng/mL: Negative    0.04-0.779 ng/mL: Indeterminate Range. Suspicious of MI.  Clinical correlation required.       >=0.78  ng/mL: Consistent with myocardial injury.  Clinical correlation required.    CK-MB [[id number]]  (Normal) Collected:  07/26/17 1433    Specimen:  Blood Updated:  07/26/17 1515     CKMB 1.73 ng/mL     CK-MB Index [[id number]]  (Abnormal) Collected:  07/26/17 1433    Specimen:  Blood Updated:  07/26/17 1515     CK-MB Index 4.2 (H) %     CK [[id number]]  (Normal) Collected:  07/26/17 1833    Specimen:  Blood Updated:  07/1934     Creatine Kinase 42 U/L     CK-MB [[id number]]  (Normal) Collected:  07/26/17 1833    Specimen:  Blood Updated:  07/26/17 1936     CKMB 1.40 ng/mL     CK-MB Index [[id number]]  (Abnormal) Collected:  07/26/17 1833    Specimen:  Blood Updated:  07/26/17 1936     CK-MB Index 3.3 (H) %     Myoglobin, Serum [[id number]]  (Normal) Collected:  07/26/17 1833    Specimen:  Blood Updated:  07/26/17 1937     Myoglobin 50.0 ng/mL     CK [[id number]]  (Normal) Collected:  07/27/17 0128    Specimen:  Blood Updated:  07/27/17 0230      Creatine Kinase 46 U/L     Myoglobin, Serum [[id number]]  (Normal) Collected:  07/27/17 0128    Specimen:  Blood Updated:  07/27/17 0238     Myoglobin 39.0 ng/mL     CK-MB [[id number]]  (Normal) Collected:  07/27/17 0128    Specimen:  Blood Updated:  07/27/17 0238     CKMB 0.95 ng/mL     CK-MB Index [[id number]]  (Normal) Collected:  07/27/17 0128    Specimen:  Blood Updated:  07/27/17 0238     CK-MB Index 2.1 %     CK [[id number]] Collected:  07/27/17 0724    Specimen:  Blood Updated:  07/27/17 0738    CK-MB [[id number]] Collected:  07/27/17 0724    Specimen:  Blood Updated:  07/27/17 0738    Myoglobin, Serum [[id number]] Collected:  07/27/17 0724    Specimen:  Blood Updated:  07/27/17 0738          Imaging Results (last 7 days)     Procedure Component Value Units Date/Time    XR Chest 2 View [[id number]] Collected:  07/24/17 0735     Updated:  07/24/17 0737    Narrative:       EXAMINATION: XR CHEST 2 VW-      CLINICAL INDICATION:     chest pain     TECHNIQUE:  XR CHEST 2 VW-      COMPARISON: NONE      FINDINGS:   Calcified granuloma left lower lobe. Lungs otherwise clear.   Heart and mediastinal contours are unremarkable.   No pneumothorax.   No pleural effusion.   No acute osseous findings.  Aortic calcifications are noted.       Impression:       No radiographic evidence of acute cardiac or pulmonary  disease.     This report was finalized on 7/24/2017 7:35 AM by Dr. Vishal Hartman MD.       CT Head Without Contrast [[id number]] Collected:  07/24/17 0736     Updated:  07/24/17 0739    Narrative:       EXAMINATION: CT HEAD WO CONTRAST-      CLINICAL INDICATION:     h/o trauma on blood thinner; R79.89-Other  specified abnormal findings of blood chemistry; S22.000G-Wedge  compression fracture of unspecified thoracic vertebra, subsequent  encounter for fracture with delayed healing; K80.20-Calculus of  gallbladder without cholecystitis without obstruction     COMPARISON:    06/05/2017     Technique: Multiple CT axial images  were obtained through the level of  the brain without IV contrast administration. Reformatted images in the  coronal and/or sagittal plane(s) were generated from the axial data set  to facilitate diagnostic accuracy and/or surgical planning.     Radiation dose reduction techniques were utilized per ALARA protocol.  Automated exposure control was initiated through either or [employer] or  [employer] software packages by  protocol.       DOSE (DLP mGy-cm): 1070.1 mGy.cm     FINDINGS:   Stable advanced chronic small vessel ischemic disease.  Chronic appearing left side infarcts also stable. No acute intracranial  abnormality. No midline shift or mass effect. No hydrocephalus or  intracranial hemorrhage. There is no CT evidence of acute vascular  territory infarct.  Bone windows show no acute osseous abnormality.       Impression:       1. Stable exam demonstrating no CT evidence of acute intracranial  abnormality.  2. Stable advanced chronic small vessel ischemic disease and chronic  appearing left frontal region infarct.  3. Stable left mastoid effusion.     This report was finalized on 7/24/2017 7:37 AM by Dr. Vishal Hartman MD.       CT Chest Without Contrast [[id number]] Collected:  07/24/17 0737     Updated:  07/24/17 0742    Narrative:       EXAM: CT CHEST WO CONTRAST-              CLINICAL INDICATION:abn CXR      COMPARISON: NONE.     TECHNIQUE: Multiple axial CT images were obtained from lung apex through  upper abdomen with administration of IV contrast. Reformatted images in  the coronal and/or sagittal plane(s) were generated from the axial data  set to facilitate diagnostic accuracy and/or surgical planning.  Oral Contrast:NONE.     Radiation dose reduction techniques were utilized per ALARA protocol.  Automated exposure control was initiated through either or [employer] or  [employer] software packages by  protocol.    DOSE (DLP mGy-cm): 312.54 mGy.cm        FINDINGS:     LUNGS: Chronic  appearing interstitial lung changes with underlying mild  COPD. Left upper lobe scarring. Calcified granuloma left lower lobe.     HEART: Moderate-advanced coronary vascular calcifications.     PERICARDIUM: No effusion.     MEDIASTINUM: No masses. No enlarged lymph nodes.  No fluid collections.     PLEURA: No pleural effusion. No pleural mass or abnormal calcification.     MAJOR AIRWAYS: Clear. No intrinsic mass.     VASCULATURE: No evidence of aneurysm.     VISUALIZED UPPER ABDOMEN: Numerous layering gallstones.     OTHER: None.     BONES: Since the previous exam, compression deformity with near complete  height loss noted of T11. Compression deformity of T1 with only minimal  height loss but no bony retropulsion.       Impression:          1. Stable chronic appearing pulmonary changes.  2. Development of compression deformities of T1 and T11 new since the  previous exam.  3. No acute thoracic findings otherwise noted.     This report was finalized on 7/24/2017 7:40 AM by Dr. Vishal Hartman MD.             ECG/EMG Results (last 72 hours)     Procedure Component Value Units Date/Time    Adult Transthoracic Echo Complete [[id number]] Collected:  07/24/17 1540     Updated:  07/24/17 1804     BSA 1.4 m^2      IVSd 1.1 cm      LVIDd 5.0 cm      LVIDs 3.4 cm      LVPWd 0.88 cm      IVS/LVPW 1.3     FS 30.9 %      EDV(Teich) 117.5 ml      ESV(Teich) 49.0 ml      EF(Teich) 58.3 %      EDV(cubed) 124.0 ml      ESV(cubed) 40.9 ml      EF(cubed) 67.0 %      LV mass(C)d 183.2 grams      LV mass(C)dI 127.5 grams/m^2      SV(Teich) 68.5 ml      SI(Teich) 47.7 ml/m^2      SV(cubed) 83.0 ml      SI(cubed) 57.8 ml/m^2      Ao root diam 2.6 cm      Ao root area 5.3 cm^2      ACS 1.3 cm      LA dimension 3.8 cm      LA/Ao 1.5     LVOT diam 2.0 cm      LVOT area 3.1 cm^2      LVOT area(traced) 3.1 cm^2      LVLd ap4 6.8 cm      EDV(MOD-sp4) 50.0 ml      LVLs ap4 6.5 cm      ESV(MOD-sp4) 27.0 ml      EF(MOD-sp4) 46.0 %      SV(MOD-sp4)  23.0 ml      SI(MOD-sp4) 16.0 ml/m^2      Ao root area (BSA corrected) 1.8     CONTRAST EF 4CH 46.0 ml/m^2      LV Diastolic corrected for BSA 34.8 ml/m^2      LV Systolic corrected for BSA 18.8 ml/m^2      MV E max selena 65.1 cm/sec      MV A max selena 124.1 cm/sec      MV E/A 0.52     PA acc slope 496.0 cm/sec^2      PA acc time 0.14 sec      PA pr(Accel) 17.2 mmHg       CV ECHO BUBBA - BZI_BMI 21.1 kilograms/m^2      BH CV ECHO BUBBA - BSA(HAYCOCK) 1.4 m^2       CV ECHO BUBBA - BZI_METRIC_WEIGHT 49.0 kg       CV ECHO BUBBA - BZI_METRIC_HEIGHT 152.4 cm      Echo EF Estimated 65 %     Narrative:         · The study is technically difficult for diagnosis.  · Left ventricular systolic function is normal. Estimated EF = 65%.  · Left ventricular diastolic dysfunction (grade I) consistent with   impaired relaxation.  · There are no significant valvular abnormalities noted.  · There is no evidence of pericardial effusion.     There is no prior study for comparison.      ECG 12 Lead [[id number]] Collected:  07/24/17 0559     Updated:  07/26/17 0715    Narrative:       Test Reason : elevated troponin  Blood Pressure : **/** mmHG  Vent. Rate : 082 BPM     Atrial Rate : 082 BPM     P-R Int : 188 ms          QRS Dur : 090 ms      QT Int : 418 ms       P-R-T Axes : 088 042 059 degrees     QTc Int : 488 ms    Normal sinus rhythm    Abnormal ECG  When compared with ECG of 05-JUN-2017 17:25,  SC interval has decreased  Confirmed by Joe Navarrete ([id number]) on 7/26/2017 7:15:26 AM    Referred By:  CARL           Confirmed By:Joe Navarrete    ECG 12 Lead [[id number]] Collected:  07/26/17 1401     Updated:  07/26/17 1403    Narrative:       Test Reason : Chest Pain and Pressure  Blood Pressure : **/** mmHG  Vent. Rate : 065 BPM     Atrial Rate : 065 BPM     P-R Int : 196 ms          QRS Dur : 094 ms      QT Int : 448 ms       P-R-T Axes : 050 025 048 degrees     QTc Int : 465 ms    Normal sinus rhythm  Normal ECG  When compared with ECG  of 24-JUL-2017 05:59,  No significant change was found    Referred By:  PAOLO           Confirmed By:             Discharge Diagnosis:   Unstable angina     Cholelithiasis     Sepsis      Accelerated hypertension       Discharge Medications   Evi Smith   Home Medication Instructions JESSY:[id number]    Printed on:07/27/17 5073   Medication Information                      amLODIPine (NORVASC) 5 MG tablet  Take 5 mg by mouth Daily.             aspirin 81 MG EC tablet  Take 81 mg by mouth Every Night.             atorvastatin (LIPITOR) 20 MG tablet  Take 1 tablet by mouth Every Night.             carvedilol (COREG) 12.5 MG tablet  Take 1 tablet by mouth Every 12 (Twelve) Hours.             CloNIDine (CATAPRES) 0.2 MG tablet  Take 0.2 mg by mouth 3 (Three) Times a Day.             clopidogrel (PLAVIX) 75 MG tablet  Take 75 mg by mouth Every Night.             fosinopril (MONOPRIL) 40 MG tablet  Take 40 mg by mouth Daily.             isosorbide mononitrate (IMDUR) 30 MG 24 hr tablet  Take 1 tablet by mouth Daily.             levothyroxine (SYNTHROID, LEVOTHROID) 100 MCG tablet  Take 100 mcg by mouth Daily.             loratadine (CLARITIN) 10 MG tablet  Take 10 mg by mouth Daily As Needed for Allergies.             omeprazole (priLOSEC) 20 MG capsule  Take 20 mg by mouth Daily.             polyethylene glycol (MIRALAX) powder  Take 17 g by mouth Daily.             raNITIdine (ZANTAC) 150 MG tablet  Take 150 mg by mouth Daily.             spironolactone (ALDACTONE) 25 MG tablet  Take 1 tablet by mouth Daily.             vitamin C (ASCORBIC ACID) 500 MG tablet  Take 500 mg by mouth Daily.                 Discharge Diet:   Diet Instructions     Diet: Regular, Cardiac; Thin Liquids, No Restrictions       Discharge Diet:   Regular  Cardiac      Fluid Consistency:  Thin Liquids, No Restrictions                 Activity at Discharge:   Activity Instructions     Activity as Tolerated                     Follow-up  Appointments  No future appointments.  Additional Instructions for the Follow-ups that You Need to Schedule     Discharge Follow-Up With Specified Provider    As directed    To:  navin   Follow Up:  1 Week                 Test Results Pending at Discharge   Order Current Status    CK In process    CK-MB In process    Myoglobin, Serum In process           Florence Zheng MD  07/27/17  7:39 AM

## 2020-07-14 ENCOUNTER — TELEPHONE (OUTPATIENT)
Dept: SURGERY | Facility: CLINIC | Age: 85
End: 2020-07-14

## 2020-07-14 ENCOUNTER — OFFICE VISIT (OUTPATIENT)
Dept: SURGERY | Facility: CLINIC | Age: 85
End: 2020-07-14

## 2020-07-14 VITALS — BODY MASS INDEX: 22.38 KG/M2 | WEIGHT: 111 LBS | HEIGHT: 59 IN

## 2020-07-14 DIAGNOSIS — Z01.818 PREOP TESTING: Primary | ICD-10-CM

## 2020-07-14 DIAGNOSIS — L98.9 SKIN LESION OF FACE: Primary | ICD-10-CM

## 2020-07-14 PROCEDURE — 99203 OFFICE O/P NEW LOW 30 MIN: CPT | Performed by: SURGERY

## 2020-07-14 NOTE — PROGRESS NOTES
Xena Smith is a 89 y.o. female.     Chief Complaint: left cheek lesion    History of Present Illness She is a 89 yo who had a basal cell skin cancer excised on her left cheek 2 years ago. A firm lump has come up in the scar the last few weeks and is growing.     The following portions of the patient's history were reviewed and updated as appropriate: current medications, past family history, past medical history, past social history, past surgical history and problem list.    Review of Systems   Constitutional: Negative for activity change, appetite change, chills, fever and unexpected weight change.   HENT: Negative for congestion, facial swelling and sore throat.    Eyes: Negative for photophobia and visual disturbance.   Respiratory: Negative for chest tightness, shortness of breath and wheezing.    Cardiovascular: Negative for chest pain, palpitations and leg swelling.   Gastrointestinal: Negative for abdominal distention, abdominal pain, anal bleeding, blood in stool, constipation, diarrhea, nausea, rectal pain and vomiting.   Endocrine: Negative for cold intolerance, heat intolerance, polydipsia and polyuria.   Genitourinary: Negative for difficulty urinating, dysuria, flank pain and urgency.   Musculoskeletal: Negative for back pain and myalgias.   Skin: Negative for rash and wound.   Allergic/Immunologic: Negative for immunocompromised state.   Neurological: Positive for weakness. Negative for dizziness, seizures, syncope, light-headedness, numbness and headaches.   Hematological: Negative for adenopathy. Does not bruise/bleed easily.   Psychiatric/Behavioral: Negative for behavioral problems and confusion. The patient is not nervous/anxious.        Objective   Physical Exam   Constitutional: She is oriented to person, place, and time. She appears well-developed and well-nourished. She does not appear ill. No distress.       HENT:   Head: Normocephalic. Head is without laceration. Hair is  normal.   Right Ear: Hearing and ear canal normal.   Left Ear: Hearing and ear canal normal.   Nose: Nose normal. No sinus tenderness. No epistaxis. Right sinus exhibits no maxillary sinus tenderness and no frontal sinus tenderness. Left sinus exhibits no maxillary sinus tenderness and no frontal sinus tenderness.   Eyes: Pupils are equal, round, and reactive to light. Conjunctivae and lids are normal.   Neck: Normal range of motion. No JVD present. No tracheal tenderness present. No tracheal deviation present. No thyroid mass and no thyromegaly present.   Cardiovascular: Normal rate and regular rhythm. Exam reveals no gallop.   No murmur heard.  Pulmonary/Chest: Effort normal and breath sounds normal. No stridor. She has no wheezes. She exhibits no tenderness.   Abdominal: Soft. Bowel sounds are normal. She exhibits no distension, no ascites and no mass. There is no tenderness. There is no rebound and no guarding. No hernia.   Musculoskeletal: She exhibits no edema or deformity.   Lymphadenopathy:     She has no cervical adenopathy.     She has no axillary adenopathy.        Right: No inguinal and no supraclavicular adenopathy present.        Left: No inguinal and no supraclavicular adenopathy present.   Neurological: She is alert and oriented to person, place, and time. She exhibits normal muscle tone.   Skin: Skin is warm, dry and intact. No rash noted. No erythema. No pallor.   Psychiatric: She has a normal mood and affect. Her behavior is normal. Thought content normal.   Vitals reviewed.      Assessment/Plan   Evi was seen today for cyst.    Diagnoses and all orders for this visit:    Skin lesion of face    excise in the OR

## 2020-07-14 NOTE — H&P (VIEW-ONLY)
Xena Smith is a 89 y.o. female.     Chief Complaint: left cheek lesion    History of Present Illness She is a 89 yo who had a basal cell skin cancer excised on her left cheek 2 years ago. A firm lump has come up in the scar the last few weeks and is growing.     The following portions of the patient's history were reviewed and updated as appropriate: current medications, past family history, past medical history, past social history, past surgical history and problem list.    Review of Systems   Constitutional: Negative for activity change, appetite change, chills, fever and unexpected weight change.   HENT: Negative for congestion, facial swelling and sore throat.    Eyes: Negative for photophobia and visual disturbance.   Respiratory: Negative for chest tightness, shortness of breath and wheezing.    Cardiovascular: Negative for chest pain, palpitations and leg swelling.   Gastrointestinal: Negative for abdominal distention, abdominal pain, anal bleeding, blood in stool, constipation, diarrhea, nausea, rectal pain and vomiting.   Endocrine: Negative for cold intolerance, heat intolerance, polydipsia and polyuria.   Genitourinary: Negative for difficulty urinating, dysuria, flank pain and urgency.   Musculoskeletal: Negative for back pain and myalgias.   Skin: Negative for rash and wound.   Allergic/Immunologic: Negative for immunocompromised state.   Neurological: Positive for weakness. Negative for dizziness, seizures, syncope, light-headedness, numbness and headaches.   Hematological: Negative for adenopathy. Does not bruise/bleed easily.   Psychiatric/Behavioral: Negative for behavioral problems and confusion. The patient is not nervous/anxious.        Objective   Physical Exam   Constitutional: She is oriented to person, place, and time. She appears well-developed and well-nourished. She does not appear ill. No distress.       HENT:   Head: Normocephalic. Head is without laceration. Hair is  normal.   Right Ear: Hearing and ear canal normal.   Left Ear: Hearing and ear canal normal.   Nose: Nose normal. No sinus tenderness. No epistaxis. Right sinus exhibits no maxillary sinus tenderness and no frontal sinus tenderness. Left sinus exhibits no maxillary sinus tenderness and no frontal sinus tenderness.   Eyes: Pupils are equal, round, and reactive to light. Conjunctivae and lids are normal.   Neck: Normal range of motion. No JVD present. No tracheal tenderness present. No tracheal deviation present. No thyroid mass and no thyromegaly present.   Cardiovascular: Normal rate and regular rhythm. Exam reveals no gallop.   No murmur heard.  Pulmonary/Chest: Effort normal and breath sounds normal. No stridor. She has no wheezes. She exhibits no tenderness.   Abdominal: Soft. Bowel sounds are normal. She exhibits no distension, no ascites and no mass. There is no tenderness. There is no rebound and no guarding. No hernia.   Musculoskeletal: She exhibits no edema or deformity.   Lymphadenopathy:     She has no cervical adenopathy.     She has no axillary adenopathy.        Right: No inguinal and no supraclavicular adenopathy present.        Left: No inguinal and no supraclavicular adenopathy present.   Neurological: She is alert and oriented to person, place, and time. She exhibits normal muscle tone.   Skin: Skin is warm, dry and intact. No rash noted. No erythema. No pallor.   Psychiatric: She has a normal mood and affect. Her behavior is normal. Thought content normal.   Vitals reviewed.      Assessment/Plan   Evi was seen today for cyst.    Diagnoses and all orders for this visit:    Skin lesion of face    excise in the OR

## 2020-07-14 NOTE — TELEPHONE ENCOUNTER
Patient is aware of her PAT and Covid screening tomorrow at 10:30 am. Surgery is scheduled for 7/17 @ 930 with Dr. Benavidez.She is aware to be off her BT meds till after surgery. She is aware to be NPO and have a  on the day of surgery.

## 2020-07-15 ENCOUNTER — LAB (OUTPATIENT)
Dept: LAB | Facility: HOSPITAL | Age: 85
End: 2020-07-15

## 2020-07-15 ENCOUNTER — APPOINTMENT (OUTPATIENT)
Dept: PREADMISSION TESTING | Facility: HOSPITAL | Age: 85
End: 2020-07-15

## 2020-07-15 DIAGNOSIS — Z01.818 PREOP TESTING: ICD-10-CM

## 2020-07-15 PROCEDURE — 93005 ELECTROCARDIOGRAM TRACING: CPT

## 2020-07-15 PROCEDURE — U0002 COVID-19 LAB TEST NON-CDC: HCPCS

## 2020-07-15 PROCEDURE — U0004 COV-19 TEST NON-CDC HGH THRU: HCPCS

## 2020-07-15 PROCEDURE — 93010 ELECTROCARDIOGRAM REPORT: CPT | Performed by: INTERNAL MEDICINE

## 2020-07-15 PROCEDURE — C9803 HOPD COVID-19 SPEC COLLECT: HCPCS

## 2020-07-15 NOTE — DISCHARGE INSTRUCTIONS
TAKE the following medications the morning of surgery:    All heart or blood pressure medications    Please discontinue all blood thinners and anticoagulants (except aspirin) prior to surgery as per your surgeon and cardiologist instructions.  Aspirin may be continued up to the day prior to surgery.    HOLD all diabetic medications the morning of surgery as order by physician.    Arrival time for surgery on 7/17/20 is 0930 am.    A RESPONSIBLE PERSON MUST REMAIN IN THE WAITING ROOM DURING YOUR PROCEDURE AND A RESPONSIBLE  MUST BE AVAILABLE UPON YOUR DISCHARGE.    General Instructions:  • Do NOT eat or drink after midnight 7/16/20 which includes water, mints, or gum.  • You may brush your teeth. Dental appliances that are removable must be taken out day of surgery.  • Do NOT smoke, chew tobacco, or drink alcohol within 24 hours prior to surgery.  • Bring medications in original bottles, any inhalers and if applicable your C-PAP/BI-PAP machine  • Bring any papers given to you in the doctor’s office  • Wear clean, comfortable clothes and socks  • Do NOT wear contact lenses or make-up or dark nail polish.  Bring a case for your glasses if applicable.  • Bring crutches or walker if applicable  • Leave all other valuables and jewelry at home  • If you were given a blood bank armband, continue to wear it until discharged.    Preventing a Surgical Site Infection:  • Shower the night before surgery (unless instructed otherwise) using a fresh bar of anti-bacterial soap (such as Dial) and clean washcloth.  Dry with a clean towel and dress in clean clothing.  • For 2 to 3 days before surgery, avoid shaving with a razor near where you will have surgery because the razor can irritate skin and make it easier to develop an infection.  Ask your surgeon if you will be receiving antibiotics prior to surgery.  • Make sure you, your family, and all healthcare providers clean their hands with soap and water or an alcohol-based hand   before caring for you or your wound.  • If at all possible, quit smoking as many days before surgery as you can.    Day of Surgery:  Upon arrival, a pre-op nurse and anesthesiologist will review your health history, obtain vital signs, and answer questions you may have.  The only belongings needed at this time will be your home medications and if applicable you C-PAP/BI-PAP machine.  If you are staying overnight, your family can leave the rest of your belongings in the car and bring them to your room later.  A pre-op nurse will start an IV and you may receive medication in preparation for surgery.  Due to patient privacy and limited space, only one member of your family will be able to accompany you in the pre-op area.  While you are in surgery your family should notify the waiting room  if they leave the waiting room area and provide a contact number.  Please be aware that surgery does come with discomfort.  We want to make every effort to control your discomfort so please discuss any uncontrolled symptoms with your nurse.  Your doctor will most likely have prescribed pain medications.  If you are going home after surgery you will receive individualized written care instructions before being discharged.  A responsible adult must drive you to and from the hospital on the day of surgery and stay with you for 24 hours.  If you are staying overnight following surgery, you will be transported to your hospital room following the recovery period.

## 2020-07-16 LAB
REF LAB TEST METHOD: NORMAL
SARS-COV-2 RNA RESP QL NAA+PROBE: NOT DETECTED

## 2020-07-17 ENCOUNTER — HOSPITAL ENCOUNTER (OUTPATIENT)
Facility: HOSPITAL | Age: 85
Setting detail: HOSPITAL OUTPATIENT SURGERY
Discharge: HOME | End: 2020-07-17
Attending: SURGERY | Admitting: SURGERY

## 2020-07-17 ENCOUNTER — ANESTHESIA (OUTPATIENT)
Dept: PERIOP | Facility: HOSPITAL | Age: 85
End: 2020-07-17

## 2020-07-17 ENCOUNTER — ANESTHESIA EVENT (OUTPATIENT)
Dept: PERIOP | Facility: HOSPITAL | Age: 85
End: 2020-07-17

## 2020-07-17 VITALS
TEMPERATURE: 97.5 F | OXYGEN SATURATION: 97 % | BODY MASS INDEX: 22.58 KG/M2 | HEART RATE: 71 BPM | DIASTOLIC BLOOD PRESSURE: 71 MMHG | HEIGHT: 59 IN | WEIGHT: 112 LBS | RESPIRATION RATE: 18 BRPM | SYSTOLIC BLOOD PRESSURE: 177 MMHG

## 2020-07-17 DIAGNOSIS — L98.9 SKIN LESION OF FACE: ICD-10-CM

## 2020-07-17 PROCEDURE — 11642 EXC F/E/E/N/L MAL+MRG 1.1-2: CPT | Performed by: SURGERY

## 2020-07-17 PROCEDURE — 25010000002 PROPOFOL 10 MG/ML EMULSION: Performed by: NURSE ANESTHETIST, CERTIFIED REGISTERED

## 2020-07-17 PROCEDURE — 12051 INTMD RPR FACE/MM 2.5 CM/<: CPT | Performed by: SURGERY

## 2020-07-17 PROCEDURE — 88305 TISSUE EXAM BY PATHOLOGIST: CPT | Performed by: SURGERY

## 2020-07-17 PROCEDURE — 25010000002 ONDANSETRON PER 1 MG: Performed by: NURSE ANESTHETIST, CERTIFIED REGISTERED

## 2020-07-17 PROCEDURE — 25010000002 FENTANYL CITRATE (PF) 100 MCG/2ML SOLUTION: Performed by: NURSE ANESTHETIST, CERTIFIED REGISTERED

## 2020-07-17 RX ORDER — FAMOTIDINE 10 MG/ML
INJECTION, SOLUTION INTRAVENOUS AS NEEDED
Status: DISCONTINUED | OUTPATIENT
Start: 2020-07-17 | End: 2020-07-17 | Stop reason: SURG

## 2020-07-17 RX ORDER — LIDOCAINE HYDROCHLORIDE 20 MG/ML
INJECTION, SOLUTION INFILTRATION; PERINEURAL AS NEEDED
Status: DISCONTINUED | OUTPATIENT
Start: 2020-07-17 | End: 2020-07-17 | Stop reason: SURG

## 2020-07-17 RX ORDER — IPRATROPIUM BROMIDE AND ALBUTEROL SULFATE 2.5; .5 MG/3ML; MG/3ML
3 SOLUTION RESPIRATORY (INHALATION) ONCE AS NEEDED
Status: DISCONTINUED | OUTPATIENT
Start: 2020-07-17 | End: 2020-07-17 | Stop reason: HOSPADM

## 2020-07-17 RX ORDER — MAGNESIUM HYDROXIDE 1200 MG/15ML
LIQUID ORAL AS NEEDED
Status: DISCONTINUED | OUTPATIENT
Start: 2020-07-17 | End: 2020-07-17 | Stop reason: HOSPADM

## 2020-07-17 RX ORDER — OXYCODONE HYDROCHLORIDE AND ACETAMINOPHEN 5; 325 MG/1; MG/1
1 TABLET ORAL ONCE AS NEEDED
Status: DISCONTINUED | OUTPATIENT
Start: 2020-07-17 | End: 2020-07-17 | Stop reason: HOSPADM

## 2020-07-17 RX ORDER — PROPOFOL 10 MG/ML
VIAL (ML) INTRAVENOUS AS NEEDED
Status: DISCONTINUED | OUTPATIENT
Start: 2020-07-17 | End: 2020-07-17 | Stop reason: SURG

## 2020-07-17 RX ORDER — MEPERIDINE HYDROCHLORIDE 25 MG/ML
12.5 INJECTION INTRAMUSCULAR; INTRAVENOUS; SUBCUTANEOUS
Status: DISCONTINUED | OUTPATIENT
Start: 2020-07-17 | End: 2020-07-17 | Stop reason: HOSPADM

## 2020-07-17 RX ORDER — FENTANYL CITRATE 50 UG/ML
INJECTION, SOLUTION INTRAMUSCULAR; INTRAVENOUS AS NEEDED
Status: DISCONTINUED | OUTPATIENT
Start: 2020-07-17 | End: 2020-07-17 | Stop reason: SURG

## 2020-07-17 RX ORDER — SODIUM CHLORIDE 0.9 % (FLUSH) 0.9 %
10 SYRINGE (ML) INJECTION AS NEEDED
Status: DISCONTINUED | OUTPATIENT
Start: 2020-07-17 | End: 2020-07-17 | Stop reason: HOSPADM

## 2020-07-17 RX ORDER — BUPIVACAINE HYDROCHLORIDE 5 MG/ML
INJECTION, SOLUTION PERINEURAL AS NEEDED
Status: DISCONTINUED | OUTPATIENT
Start: 2020-07-17 | End: 2020-07-17 | Stop reason: HOSPADM

## 2020-07-17 RX ORDER — ONDANSETRON 2 MG/ML
4 INJECTION INTRAMUSCULAR; INTRAVENOUS AS NEEDED
Status: DISCONTINUED | OUTPATIENT
Start: 2020-07-17 | End: 2020-07-17 | Stop reason: HOSPADM

## 2020-07-17 RX ORDER — ONDANSETRON 2 MG/ML
INJECTION INTRAMUSCULAR; INTRAVENOUS AS NEEDED
Status: DISCONTINUED | OUTPATIENT
Start: 2020-07-17 | End: 2020-07-17 | Stop reason: SURG

## 2020-07-17 RX ORDER — FENTANYL CITRATE 50 UG/ML
50 INJECTION, SOLUTION INTRAMUSCULAR; INTRAVENOUS
Status: DISCONTINUED | OUTPATIENT
Start: 2020-07-17 | End: 2020-07-17 | Stop reason: HOSPADM

## 2020-07-17 RX ORDER — SODIUM CHLORIDE 0.9 % (FLUSH) 0.9 %
10 SYRINGE (ML) INJECTION EVERY 12 HOURS SCHEDULED
Status: DISCONTINUED | OUTPATIENT
Start: 2020-07-17 | End: 2020-07-17 | Stop reason: HOSPADM

## 2020-07-17 RX ORDER — MIDAZOLAM HYDROCHLORIDE 1 MG/ML
1 INJECTION INTRAMUSCULAR; INTRAVENOUS
Status: DISCONTINUED | OUTPATIENT
Start: 2020-07-17 | End: 2020-07-17 | Stop reason: HOSPADM

## 2020-07-17 RX ORDER — SODIUM CHLORIDE, SODIUM LACTATE, POTASSIUM CHLORIDE, CALCIUM CHLORIDE 600; 310; 30; 20 MG/100ML; MG/100ML; MG/100ML; MG/100ML
125 INJECTION, SOLUTION INTRAVENOUS CONTINUOUS
Status: DISCONTINUED | OUTPATIENT
Start: 2020-07-17 | End: 2020-07-17 | Stop reason: HOSPADM

## 2020-07-17 RX ADMIN — ONDANSETRON 4 MG: 2 INJECTION INTRAMUSCULAR; INTRAVENOUS at 11:51

## 2020-07-17 RX ADMIN — SODIUM CHLORIDE, POTASSIUM CHLORIDE, SODIUM LACTATE AND CALCIUM CHLORIDE 125 ML/HR: 600; 310; 30; 20 INJECTION, SOLUTION INTRAVENOUS at 11:04

## 2020-07-17 RX ADMIN — LIDOCAINE HYDROCHLORIDE 30 MG: 20 INJECTION, SOLUTION INFILTRATION; PERINEURAL at 11:51

## 2020-07-17 RX ADMIN — FENTANYL CITRATE 100 MCG: 50 INJECTION INTRAMUSCULAR; INTRAVENOUS at 11:46

## 2020-07-17 RX ADMIN — FAMOTIDINE 20 MG: 10 INJECTION INTRAVENOUS at 11:46

## 2020-07-17 RX ADMIN — PROPOFOL 100 MG: 10 INJECTION, EMULSION INTRAVENOUS at 11:51

## 2020-07-17 NOTE — ANESTHESIA POSTPROCEDURE EVALUATION
Patient: Evi Smith    Procedure Summary     Date:  07/17/20 Room / Location:  [facility] OR  / [facility] OR    Anesthesia Start:  1146 Anesthesia Stop:  1212    Procedure:  FACIAL LESION/CYST EXCISION (Left ) Diagnosis:       Skin lesion of face      (Skin lesion of face [L98.9])    Surgeon:  Jon Benavidez MD Provider:  Duarte Bond MD    Anesthesia Type:  general, MAC ASA Status:  3          Anesthesia Type: general, MAC    Vitals  Vitals Value Taken Time   /62 7/17/2020 12:27 PM   Temp 98 °F (36.7 °C) 7/17/2020 12:12 PM   Pulse 64 7/17/2020 12:27 PM   Resp 16 7/17/2020 12:27 PM   SpO2 97 % 7/17/2020 12:27 PM           Post Anesthesia Care and Evaluation    Patient location during evaluation: PHASE II  Patient participation: complete - patient participated  Level of consciousness: awake and alert  Pain score: 1  Pain management: adequate  Airway patency: patent  Anesthetic complications: No anesthetic complications  PONV Status: controlled  Cardiovascular status: acceptable  Respiratory status: acceptable  Hydration status: acceptable

## 2020-07-17 NOTE — OP NOTE
HEAD NECK LESION/CYST EXCISION  Procedure Note    Evi Smith  7/17/2020    Pre-op Diagnosis:   Skin lesion of face [L98.9]    Post-op Diagnosis: same        Procedure(s):  FACIAL LESION/CYST EXCISION    Surgeon(s):  Jon Benavidez MD    Anesthesia: Anesthesia type not filed in the log.    Staff:   Circulator: Kita Flowers RN  Scrub Person: Chelsy Monge  Assistant: Jon Kamara    Estimated Blood Loss: minimal    Specimens:                Order Name Source Comment Collection Info Order Time   TISSUE PATHOLOGY EXAM Face  Collected By: Jon Benavidez MD 7/17/2020 12:05 PM         Drains: * No LDAs found *    Procedure: The left cheek was prepped and draped. Local was injected. The nodule was about 1.3 cm and was excised. The cautery was used briefly and subcutaneous tissue closed with a vicryl suture. The skin was closed with nylon suture.     Findings:      Complications: none   Grafts / Implants N/A    Jon Benavidez MD     Date: 7/17/2020  Time: 12:07

## 2020-07-17 NOTE — ANESTHESIA PROCEDURE NOTES
Airway  Urgency: elective    Date/Time: 7/17/2020 11:51 AM  Airway not difficult    General Information and Staff    Patient location during procedure: OR  Anesthesiologist: Duarte Bond MD  CRNA: Yessenia Wisdom CRNA    Indications and Patient Condition  Indications for airway management: airway protection    Preoxygenated: yes  Mask difficulty assessment: 0 - not attempted    Final Airway Details  Final airway type: supraglottic airway      Successful airway: classic  Size 3    Number of attempts at approach: 1  Assessment: lips, teeth, and gum same as pre-op    Additional Comments  LMA placed with no trauma noted. Patient tolerated well. Good seal. Secured.

## 2020-07-17 NOTE — ANESTHESIA PREPROCEDURE EVALUATION
Anesthesia Evaluation     history of anesthetic complications: PONV  NPO Solid Status: > 8 hours  NPO Liquid Status: > 8 hours           Airway   Mallampati: II  TM distance: <3 FB  Neck ROM: limited  No difficulty expected  Dental    (+) upper dentures and lower dentures    Pulmonary - normal exam   Cardiovascular - normal exam    (+) hypertension,       Neuro/Psych  (+) CVA,     GI/Hepatic/Renal/Endo    (+)  GERD,      Musculoskeletal     Abdominal  - normal exam   Substance History      OB/GYN          Other                        Anesthesia Plan    ASA 3     general and MAC     intravenous induction     Anesthetic plan, all risks, benefits, and alternatives have been provided, discussed and informed consent has been obtained with: patient.

## 2020-07-20 LAB
LAB AP CASE REPORT: NORMAL
PATH REPORT.FINAL DX SPEC: NORMAL

## 2020-07-27 ENCOUNTER — OFFICE VISIT (OUTPATIENT)
Dept: SURGERY | Facility: CLINIC | Age: 85
End: 2020-07-27

## 2020-07-27 VITALS — HEIGHT: 59 IN | WEIGHT: 112 LBS | BODY MASS INDEX: 22.58 KG/M2

## 2020-07-27 DIAGNOSIS — L98.9 SKIN LESION OF FACE: Primary | ICD-10-CM

## 2020-07-27 PROCEDURE — 99024 POSTOP FOLLOW-UP VISIT: CPT | Performed by: SURGERY

## 2020-07-27 NOTE — PROGRESS NOTES
Subjective   Evi Smith is a 89 y.o. female.     Chief Complaint: post basal cell excision    History of Present Illness Her wound is doing ok. Minor bruising. The path did have a deep margin positive, but she is thin and a wider excision would risk injury to the facial nerve.     The following portions of the patient's history were reviewed and updated as appropriate: current medications, past family history, past medical history, past social history, past surgical history and problem list.    Review of Systems    Objective   Physical Exam wound ok. Sutures removed.    Assessment/Plan   Evi was seen today for follow-up.    Diagnoses and all orders for this visit:    Skin lesion of face      Return prn

## 2020-11-12 ENCOUNTER — APPOINTMENT (OUTPATIENT)
Dept: GENERAL RADIOLOGY | Facility: HOSPITAL | Age: 85
End: 2020-11-12

## 2020-11-12 ENCOUNTER — APPOINTMENT (OUTPATIENT)
Dept: CT IMAGING | Facility: HOSPITAL | Age: 85
End: 2020-11-12

## 2020-11-12 ENCOUNTER — HOSPITAL ENCOUNTER (EMERGENCY)
Facility: HOSPITAL | Age: 85
Discharge: ANOTHER HOSPITAL | End: 2020-11-12
Attending: STUDENT IN AN ORGANIZED HEALTH CARE EDUCATION/TRAINING PROGRAM | Admitting: STUDENT IN AN ORGANIZED HEALTH CARE EDUCATION/TRAINING PROGRAM

## 2020-11-12 VITALS
RESPIRATION RATE: 18 BRPM | TEMPERATURE: 98.1 F | HEART RATE: 68 BPM | SYSTOLIC BLOOD PRESSURE: 173 MMHG | OXYGEN SATURATION: 97 % | BODY MASS INDEX: 23.18 KG/M2 | WEIGHT: 115 LBS | HEIGHT: 59 IN | DIASTOLIC BLOOD PRESSURE: 67 MMHG
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
## 2020-11-12 DIAGNOSIS — S06.5XAA SDH (SUBDURAL HEMATOMA) (HCC): ICD-10-CM

## 2020-11-12 DIAGNOSIS — I60.9 SAH (SUBARACHNOID HEMORRHAGE) (HCC): ICD-10-CM

## 2020-11-12 LAB
ABO GROUP BLD: NORMAL
ANION GAP SERPL CALCULATED.3IONS-SCNC: 9.3 MMOL/L (ref 5–15)
APTT PPP: 31.1 SECONDS (ref 25.6–35.3)
BASOPHILS # BLD AUTO: 0.11 10*3/MM3 (ref 0–0.2)
BASOPHILS NFR BLD AUTO: 0.8 % (ref 0–1.5)
BLD GP AB SCN SERPL QL: NEGATIVE
BUN SERPL-MCNC: 13 MG/DL (ref 8–23)
BUN/CREAT SERPL: 16.5 (ref 7–25)
CALCIUM SPEC-SCNC: 9.5 MG/DL (ref 8.2–9.6)
CHLORIDE SERPL-SCNC: 103 MMOL/L (ref 98–107)
CO2 SERPL-SCNC: 26.7 MMOL/L (ref 22–29)
CREAT SERPL-MCNC: 0.79 MG/DL (ref 0.57–1)
DEPRECATED RDW RBC AUTO: 53.1 FL (ref 37–54)
EOSINOPHIL # BLD AUTO: 0.11 10*3/MM3 (ref 0–0.4)
EOSINOPHIL NFR BLD AUTO: 0.8 % (ref 0.3–6.2)
ERYTHROCYTE [DISTWIDTH] IN BLOOD BY AUTOMATED COUNT: 15 % (ref 12.3–15.4)
GFR SERPL CREATININE-BSD FRML MDRD: 68 ML/MIN/1.73
GLUCOSE SERPL-MCNC: 124 MG/DL (ref 65–99)
HCT VFR BLD AUTO: 42.2 % (ref 34–46.6)
HGB BLD-MCNC: 13.2 G/DL (ref 12–15.9)
IMM GRANULOCYTES # BLD AUTO: 0.26 10*3/MM3 (ref 0–0.05)
IMM GRANULOCYTES NFR BLD AUTO: 1.8 % (ref 0–0.5)
INR PPP: 1.08 (ref 0.9–1.1)
LYMPHOCYTES # BLD AUTO: 1.68 10*3/MM3 (ref 0.7–3.1)
LYMPHOCYTES NFR BLD AUTO: 11.6 % (ref 19.6–45.3)
MCH RBC QN AUTO: 29.8 PG (ref 26.6–33)
MCHC RBC AUTO-ENTMCNC: 31.3 G/DL (ref 31.5–35.7)
MCV RBC AUTO: 95.3 FL (ref 79–97)
MONOCYTES # BLD AUTO: 1.73 10*3/MM3 (ref 0.1–0.9)
MONOCYTES NFR BLD AUTO: 11.9 % (ref 5–12)
NEUTROPHILS NFR BLD AUTO: 10.65 10*3/MM3 (ref 1.7–7)
NEUTROPHILS NFR BLD AUTO: 73.1 % (ref 42.7–76)
NRBC BLD AUTO-RTO: 0 /100 WBC (ref 0–0.2)
PLATELET # BLD AUTO: 154 10*3/MM3 (ref 140–450)
PMV BLD AUTO: 11.4 FL (ref 6–12)
POTASSIUM SERPL-SCNC: 4 MMOL/L (ref 3.5–5.2)
PROTHROMBIN TIME: 13.8 SECONDS (ref 11.9–14.1)
RBC # BLD AUTO: 4.43 10*6/MM3 (ref 3.77–5.28)
RH BLD: POSITIVE
SARS-COV-2 RDRP RESP QL NAA+PROBE: NOT DETECTED
SODIUM SERPL-SCNC: 139 MMOL/L (ref 136–145)
T&S EXPIRATION DATE: NORMAL
WBC # BLD AUTO: 14.54 10*3/MM3 (ref 3.4–10.8)

## 2020-11-12 PROCEDURE — 85730 THROMBOPLASTIN TIME PARTIAL: CPT | Performed by: STUDENT IN AN ORGANIZED HEALTH CARE EDUCATION/TRAINING PROGRAM

## 2020-11-12 PROCEDURE — 25010000003 LEVETIRACETAM IN NACL 0.75% 1000 MG/100ML SOLUTION: Performed by: STUDENT IN AN ORGANIZED HEALTH CARE EDUCATION/TRAINING PROGRAM

## 2020-11-12 PROCEDURE — 36415 COLL VENOUS BLD VENIPUNCTURE: CPT

## 2020-11-12 PROCEDURE — 70450 CT HEAD/BRAIN W/O DYE: CPT

## 2020-11-12 PROCEDURE — 96374 THER/PROPH/DIAG INJ IV PUSH: CPT

## 2020-11-12 PROCEDURE — 85025 COMPLETE CBC W/AUTO DIFF WBC: CPT | Performed by: STUDENT IN AN ORGANIZED HEALTH CARE EDUCATION/TRAINING PROGRAM

## 2020-11-12 PROCEDURE — 86900 BLOOD TYPING SEROLOGIC ABO: CPT | Performed by: STUDENT IN AN ORGANIZED HEALTH CARE EDUCATION/TRAINING PROGRAM

## 2020-11-12 PROCEDURE — 87635 SARS-COV-2 COVID-19 AMP PRB: CPT | Performed by: STUDENT IN AN ORGANIZED HEALTH CARE EDUCATION/TRAINING PROGRAM

## 2020-11-12 PROCEDURE — 99284 EMERGENCY DEPT VISIT MOD MDM: CPT

## 2020-11-12 PROCEDURE — 90715 TDAP VACCINE 7 YRS/> IM: CPT | Performed by: STUDENT IN AN ORGANIZED HEALTH CARE EDUCATION/TRAINING PROGRAM

## 2020-11-12 PROCEDURE — 86901 BLOOD TYPING SEROLOGIC RH(D): CPT

## 2020-11-12 PROCEDURE — 72125 CT NECK SPINE W/O DYE: CPT

## 2020-11-12 PROCEDURE — 80048 BASIC METABOLIC PNL TOTAL CA: CPT | Performed by: STUDENT IN AN ORGANIZED HEALTH CARE EDUCATION/TRAINING PROGRAM

## 2020-11-12 PROCEDURE — 86900 BLOOD TYPING SEROLOGIC ABO: CPT

## 2020-11-12 PROCEDURE — 85610 PROTHROMBIN TIME: CPT | Performed by: STUDENT IN AN ORGANIZED HEALTH CARE EDUCATION/TRAINING PROGRAM

## 2020-11-12 PROCEDURE — 86901 BLOOD TYPING SEROLOGIC RH(D): CPT | Performed by: STUDENT IN AN ORGANIZED HEALTH CARE EDUCATION/TRAINING PROGRAM

## 2020-11-12 PROCEDURE — 86850 RBC ANTIBODY SCREEN: CPT | Performed by: STUDENT IN AN ORGANIZED HEALTH CARE EDUCATION/TRAINING PROGRAM

## 2020-11-12 PROCEDURE — 25010000002 TDAP 5-2.5-18.5 LF-MCG/0.5 SUSPENSION: Performed by: STUDENT IN AN ORGANIZED HEALTH CARE EDUCATION/TRAINING PROGRAM

## 2020-11-12 PROCEDURE — 90471 IMMUNIZATION ADMIN: CPT | Performed by: STUDENT IN AN ORGANIZED HEALTH CARE EDUCATION/TRAINING PROGRAM

## 2020-11-12 RX ORDER — ACETAMINOPHEN 500 MG
1000 TABLET ORAL ONCE
Status: COMPLETED | OUTPATIENT
Start: 2020-11-12 | End: 2020-11-12

## 2020-11-12 RX ORDER — LEVETIRACETAM 10 MG/ML
1000 INJECTION INTRAVASCULAR ONCE
Status: COMPLETED | OUTPATIENT
Start: 2020-11-12 | End: 2020-11-12

## 2020-11-12 RX ADMIN — TETANUS TOXOID, REDUCED DIPHTHERIA TOXOID AND ACELLULAR PERTUSSIS VACCINE, ADSORBED 0.5 ML: 5; 2.5; 8; 8; 2.5 SUSPENSION INTRAMUSCULAR at 04:36

## 2020-11-12 RX ADMIN — ACETAMINOPHEN 1000 MG: 500 TABLET ORAL at 04:04

## 2020-11-12 RX ADMIN — LEVETIRACETAM 1000 MG: 10 INJECTION INTRAVENOUS at 04:59

## 2020-11-12 NOTE — ED NOTES
Pt leaving the ED at this time with [facility]. Pt is alert and oriented, respirations even and unlabored, VSS, NADN. Pt IV patent and intact. Pt belongings being transferred with pt.      Ashli Moe RN  11/12/20 9855

## 2020-11-12 NOTE — ED NOTES
Naida with [facility] called back. They accepted transfer, they will call back once they are in the air with the ETA.      Symes, Heather  11/12/20 8298

## 2020-11-12 NOTE — ED NOTES
Called [facility] spoke with Naida for transport. She will have them do a weather check and call me back.      Symes, Heather  11/12/20 6167

## 2020-11-12 NOTE — ED NOTES
Called [facility]'s spoke with Jordon for transfer. Placed on hold.   @[phone] Dr. Reyes is on the line with Dr. Cancino with .      Symes, Heather  11/12/20 1722

## 2024-07-12 NOTE — ED PROVIDER NOTES
Subjective   Patient is a 89-year-old female history of hypertension on aspirin Plavix coming for evaluation of head laceration.  Patient rolled out of bed and hit her head on the nightstand.  Also endorses mild pain in her left hand.  Denies any nausea vomiting chest pain presyncopal symptoms or any other recent symptoms.  States she feels well except for mild headache.  Unsure last tetanus shot.  Denies any other pain or symptoms.      History provided by:  Patient  Fall  Mechanism of injury: fall    Injury location:  Head/neck  Head/neck injury location:  Head  Incident location:  Home  Time since incident:  1 hour  Arrived directly from scene: yes    Fall:     Fall occurred:  From a bed    Point of impact:  Head    Entrapped after fall: no    Suspicion of alcohol use: no    Suspicion of drug use: no    Tetanus status:  Out of date  Prior to arrival data:     Bystander interventions:  None  Associated symptoms: headaches    Associated symptoms: no abdominal pain, no back pain, no chest pain, no nausea, no seizures and no vomiting        Review of Systems   Constitutional: Negative for fatigue and fever.   HENT: Negative for sore throat and trouble swallowing.    Eyes: Negative for pain and redness.   Respiratory: Negative for cough and shortness of breath.    Cardiovascular: Negative for chest pain and palpitations.   Gastrointestinal: Negative for abdominal pain, diarrhea, nausea and vomiting.   Genitourinary: Negative for dysuria, flank pain and hematuria.   Musculoskeletal: Negative for back pain and myalgias.   Skin: Negative for rash and wound.   Neurological: Positive for headaches. Negative for seizures.   Psychiatric/Behavioral: Negative for hallucinations and suicidal ideas.       Past Medical History:   Diagnosis Date   • Arthritis    • Compression fracture of thoracic spine, non-traumatic (CMS/[facility])    • COPD (chronic obstructive pulmonary disease) (CMS/[facility])    • GERD (gastroesophageal reflux disease)     • Hypertension    • PONV (postoperative nausea and vomiting)    • Stroke (CMS/HCC)     2000   • Thyroid disease        No Known Allergies    Past Surgical History:   Procedure Laterality Date   • APPENDECTOMY     • HEAD/NECK LESION/CYST EXCISION Left 7/17/2020    Procedure: FACIAL LESION/CYST EXCISION;  Surgeon: Jon Benavidez MD;  Location: [facility];  Service: General;  Laterality: Left;   • SKIN BIOPSY      Facial Cyst   • THYROIDECTOMY     • TUBAL ABDOMINAL LIGATION         No family history on file.    Social History     Socioeconomic History   • Marital status:      Spouse name: Not on file   • Number of children: Not on file   • Years of education: Not on file   • Highest education level: Not on file   Tobacco Use   • Smoking status: Never Smoker   • Smokeless tobacco: Never Used   Substance and Sexual Activity   • Alcohol use: No   • Drug use: No   • Sexual activity: Defer           Objective   Physical Exam  Vitals signs and nursing note reviewed.   Constitutional:       General: She is not in acute distress.     Appearance: She is not ill-appearing or diaphoretic.   HENT:      Head:      Comments: 3 cm laceration left forehead, no active bleeding, no arterial bleeding, otherwise no trauma noted     Nose: Nose normal.   Eyes:      Conjunctiva/sclera: Conjunctivae normal.   Neck:      Musculoskeletal: Normal range of motion. No muscular tenderness.      Comments: Non tender midline, back non tender  Cardiovascular:      Rate and Rhythm: Normal rate and regular rhythm.      Pulses: Normal pulses.   Pulmonary:      Effort: Pulmonary effort is normal. No respiratory distress.      Breath sounds: Normal breath sounds. No wheezing.   Abdominal:      Palpations: Abdomen is soft.      Tenderness: There is no abdominal tenderness.   Musculoskeletal: Normal range of motion.         General: No deformity.      Comments: Bruising around left 2nd digit, good rom, no other trauma noted on msk exam   Skin:      Capillary Refill: Capillary refill takes less than 2 seconds.      Findings: No rash.   Neurological:      General: No focal deficit present.      Mental Status: She is alert and oriented to person, place, and time.      Motor: No weakness.      Comments: 5/5 strength all 4 extremities, no sensory changes   Psychiatric:         Mood and Affect: Mood normal.         Procedures           ED Course  ED Course as of Nov 12 0523   Thu Nov 12, 2020   0438 CT scan appears to show epidural versus subdural hemorrhage no midline shift.  Will discuss with  for transfer.  Will give dose of Keppra.Discussed with Dr. Cancino at .  Accepted for transfer to the ER.  Patient is neuro intact at this time no changes to baseline mentation per patient or family.  Aspirin Plavix but per discussion with accepting facility will not attempt to reverse at this time.    [JA]   9693 Discussed with radiology.  They state the patient appeared to have subdural with small amount of subarachnoid.  Possible 3 mm of shift.  On my reassessment patient still well-appearing neuro intact.  Discussed plan with family.    [JA]      ED Course User Index  [JA] Richie Reyes MD                                           MDM  Number of Diagnoses or Management Options  Fall, initial encounter: new and requires workup  SAH (subarachnoid hemorrhage) (CMS/HCC): new and requires workup  SDH (subdural hematoma) (CMS/HCC): new and requires workup     Amount and/or Complexity of Data Reviewed  Clinical lab tests: ordered and reviewed  Tests in the radiology section of CPT®: ordered and reviewed  Tests in the medicine section of CPT®: ordered and reviewed  Obtain history from someone other than the patient: yes  Discuss the patient with other providers: yes  Independent visualization of images, tracings, or specimens: yes    Risk of Complications, Morbidity, and/or Mortality  Presenting problems: high  Diagnostic procedures: high  Management options:  high    Critical Care  Total time providing critical care: 30-74 minutes (Cc 32 minutes for coordination of care with accepting facility for management of intracranial hemorrhage, time at bedside, discussion with radiology..)    DDX: laceration, abrasion, fall, ich, sah, fractures    Plan: 89-year-old female coming for evaluation after mechanical fall.  Fell out of bed.  Denies any syncope or presyncopal symptoms.  Patient is nontoxic well-appearing.  Laceration to left forehead.  Will update tetanus give Tylenol for pain control check BMP CBC coags get CT head cervical spine chest x-ray and left hand x-ray to evaluate for possible abnormalities and injuries.  Patient is neuro intact at this time.    Meds given:   Medications   Tdap (BOOSTRIX) injection 0.5 mL (0.5 mL Intramuscular Given 11/12/20 0436)   acetaminophen (TYLENOL) tablet 1,000 mg (1,000 mg Oral Given 11/12/20 0404)   levETIRAcetam in NaCl 0.75% (KEPPRA) IVPB 1,000 mg (0 mg Intravenous Stopped 11/12/20 0514)        Labs:   Labs Reviewed   BASIC METABOLIC PANEL - Abnormal; Notable for the following components:       Result Value    Glucose 124 (*)     All other components within normal limits    Narrative:     GFR Normal >60  Chronic Kidney Disease <60  Kidney Failure <15     CBC WITH AUTO DIFFERENTIAL - Abnormal; Notable for the following components:    WBC 14.54 (*)     MCHC 31.3 (*)     Lymphocyte % 11.6 (*)     Immature Grans % 1.8 (*)     Neutrophils, Absolute 10.65 (*)     Monocytes, Absolute 1.73 (*)     Immature Grans, Absolute 0.26 (*)     All other components within normal limits   COVID-19,ABBOTT IN-HOUSE,NP SWAB (NO TRANSPORT MEDIA) 2 HR TAT - Normal    Narrative:     Fact sheet for providers: https://www.fda.gov/media/512702/download     Fact sheet for patients: https://www.fda.gov/media/181907/download   PROTIME-INR - Normal    Narrative:     Suggested INR therapeutic range for stable oral anticoagulant therapy:    Low Intensity therapy:    1.5-2.0  Moderate Intensity therapy:   2.0-3.0  High Intensity therapy:   2.5-4.0   APTT - Normal    Narrative:     PTT Heparin Therapeutic Range:  59 - 95 seconds     COVID PRE-OP / PRE-PROCEDURE SCREENING ORDER (NO ISOLATION)    Narrative:     The following orders were created for panel order COVID PRE-OP / PRE-PROCEDURE SCREENING ORDER (NO ISOLATION) - Swab, Nasal Cavity.  Procedure                               Abnormality         Status                     ---------                               -----------         ------                     COVID-19, ABBOTT IN-HOUS...[[id number]]  Normal              Final result                 Please view results for these tests on the individual orders.   TYPE AND SCREEN   CBC AND DIFFERENTIAL    Narrative:     The following orders were created for panel order CBC & Differential.  Procedure                               Abnormality         Status                     ---------                               -----------         ------                     CBC Auto Differential[[id number]]        Abnormal            Final result                 Please view results for these tests on the individual orders.        Rads:   CT Cervical Spine Without Contrast   Final Result      1. No acute fracture. Degenerative changes as described. Probable degenerative antegrade listhesis of C4 on C5, grade 1 in degree.      Signer Name: Derrell Deng MD    Signed: 11/12/2020 5:21 AM    [email or web address]     [facility]      CT Head Without Contrast   Final Result      1. Abnormal examination. Extra-axial fluid collection on the left, likely subdural hematoma, centered at the left temporal lobe measuring up to 9 mm thickness with localized mass effect and edematous changes in the adjacent temporal and frontal parietal   lobe. Additional subdural blood associated with the tentorium and subarachnoid hemorrhage tracking into the basilar cisterns and sylvian  fissure. Imaging findings likely reflect a subdural hematoma rather than epidural hematoma. No distinct associated   calvarial fracture.   2. Atrophy and chronic ischemic changes..         NOTIFICATION: Critical Value/emergent results were called by telephone at the time of interpretation on 11/12/2020 4:47 AM to Richie Reyes MD who verbally acknowledged these results.      Signer Name: Derrell Deng MD    Signed: 11/12/2020 4:54 AM    Workstation Name: [facility]     [facility]      XR Chest 1 View    (Results Pending)   XR Hand 3+ View Left    (Results Pending)       Interpretation: head ct abnormal with sah and sdh    EKG:NA    Vitals:    11/12/20 0443 11/12/20 0455 11/12/20 0503 11/12/20 0510   BP: (!) 183/79  173/67    BP Location:       Patient Position:       Pulse:       Resp:       Temp:       TempSrc:       SpO2: 97% 96% 98% 97%   Weight:       Height:            Dispo: Transfer by [facility] helicopter to  for further evaluation management.  Hemodynamically stable.  Neuro intact when she left the ER.  Family at bedside aware.    Final diagnoses:   Fall, initial encounter   SDH (subdural hematoma) (CMS/HCC)   SAH (subarachnoid hemorrhage) (CMS/HCC)            Richie Reyes MD  11/12/20 0524     (1) More than 48 hours/None

## (undated) DEVICE — SUT ETHLN 4/0 P3 18IN 699G

## (undated) DEVICE — HOLDER: Brand: DEROYAL

## (undated) DEVICE — TRY SKINPREP PVP SCRB W PAINT

## (undated) DEVICE — GLV SURG PREMIERPRO MIC LTX PF SZ7.5 BRN

## (undated) DEVICE — PK HD AND NK 70